# Patient Record
Sex: FEMALE | Race: ASIAN | NOT HISPANIC OR LATINO | Employment: FULL TIME | URBAN - METROPOLITAN AREA
[De-identification: names, ages, dates, MRNs, and addresses within clinical notes are randomized per-mention and may not be internally consistent; named-entity substitution may affect disease eponyms.]

---

## 2022-11-01 LAB
ALBUMIN SERPL BCG-MCNC: 4.3 G/DL (ref 3.5–5.2)
ALBUMIN UR-MCNC: 20 MG/DL
ALP SERPL-CCNC: 94 U/L (ref 35–104)
ALT SERPL W P-5'-P-CCNC: 202 U/L (ref 10–35)
ANION GAP SERPL CALCULATED.3IONS-SCNC: 10 MMOL/L (ref 7–15)
APPEARANCE UR: CLEAR
AST SERPL W P-5'-P-CCNC: 360 U/L (ref 10–35)
BASOPHILS # BLD AUTO: 0 10E3/UL (ref 0–0.2)
BASOPHILS NFR BLD AUTO: 0 %
BILIRUB DIRECT SERPL-MCNC: 0.65 MG/DL (ref 0–0.3)
BILIRUB SERPL-MCNC: 1.4 MG/DL
BILIRUB UR QL STRIP: NEGATIVE
BUN SERPL-MCNC: 9.2 MG/DL (ref 6–20)
CALCIUM SERPL-MCNC: 9.4 MG/DL (ref 8.6–10)
CHLORIDE SERPL-SCNC: 101 MMOL/L (ref 98–107)
COLOR UR AUTO: YELLOW
CREAT SERPL-MCNC: 0.93 MG/DL (ref 0.51–0.95)
DEPRECATED HCO3 PLAS-SCNC: 29 MMOL/L (ref 22–29)
EOSINOPHIL # BLD AUTO: 0 10E3/UL (ref 0–0.7)
EOSINOPHIL NFR BLD AUTO: 1 %
ERYTHROCYTE [DISTWIDTH] IN BLOOD BY AUTOMATED COUNT: 13.1 % (ref 10–15)
GFR SERPL CREATININE-BSD FRML MDRD: 87 ML/MIN/1.73M2
GLUCOSE SERPL-MCNC: 115 MG/DL (ref 70–99)
GLUCOSE UR STRIP-MCNC: NEGATIVE MG/DL
HCG UR QL: NEGATIVE
HCT VFR BLD AUTO: 41.3 % (ref 35–47)
HGB BLD-MCNC: 13.4 G/DL (ref 11.7–15.7)
HGB UR QL STRIP: ABNORMAL
HYALINE CASTS: 1 /LPF
IMM GRANULOCYTES # BLD: 0 10E3/UL
IMM GRANULOCYTES NFR BLD: 0 %
KETONES UR STRIP-MCNC: NEGATIVE MG/DL
LEUKOCYTE ESTERASE UR QL STRIP: NEGATIVE
LIPASE SERPL-CCNC: 33 U/L (ref 13–60)
LYMPHOCYTES # BLD AUTO: 1.6 10E3/UL (ref 0.8–5.3)
LYMPHOCYTES NFR BLD AUTO: 22 %
MCH RBC QN AUTO: 27.6 PG (ref 26.5–33)
MCHC RBC AUTO-ENTMCNC: 32.4 G/DL (ref 31.5–36.5)
MCV RBC AUTO: 85 FL (ref 78–100)
MONOCYTES # BLD AUTO: 0.6 10E3/UL (ref 0–1.3)
MONOCYTES NFR BLD AUTO: 8 %
MUCOUS THREADS #/AREA URNS LPF: PRESENT /LPF
NEUTROPHILS # BLD AUTO: 4.8 10E3/UL (ref 1.6–8.3)
NEUTROPHILS NFR BLD AUTO: 69 %
NITRATE UR QL: NEGATIVE
NRBC # BLD AUTO: 0 10E3/UL
NRBC BLD AUTO-RTO: 0 /100
PH UR STRIP: 6.5 [PH] (ref 5–7)
PLATELET # BLD AUTO: 327 10E3/UL (ref 150–450)
POTASSIUM SERPL-SCNC: 4 MMOL/L (ref 3.4–5.3)
PROT SERPL-MCNC: 7.4 G/DL (ref 6.4–8.3)
RBC # BLD AUTO: 4.86 10E6/UL (ref 3.8–5.2)
RBC URINE: 2 /HPF
SODIUM SERPL-SCNC: 140 MMOL/L (ref 136–145)
SP GR UR STRIP: 1.02 (ref 1–1.03)
SQUAMOUS EPITHELIAL: 4 /HPF
UROBILINOGEN UR STRIP-MCNC: 2 MG/DL
WBC # BLD AUTO: 7.1 10E3/UL (ref 4–11)
WBC URINE: 2 /HPF

## 2022-11-01 PROCEDURE — C9803 HOPD COVID-19 SPEC COLLECT: HCPCS

## 2022-11-01 PROCEDURE — 96375 TX/PRO/DX INJ NEW DRUG ADDON: CPT

## 2022-11-01 PROCEDURE — 81025 URINE PREGNANCY TEST: CPT | Performed by: STUDENT IN AN ORGANIZED HEALTH CARE EDUCATION/TRAINING PROGRAM

## 2022-11-01 PROCEDURE — 36415 COLL VENOUS BLD VENIPUNCTURE: CPT | Performed by: STUDENT IN AN ORGANIZED HEALTH CARE EDUCATION/TRAINING PROGRAM

## 2022-11-01 PROCEDURE — 82248 BILIRUBIN DIRECT: CPT | Performed by: STUDENT IN AN ORGANIZED HEALTH CARE EDUCATION/TRAINING PROGRAM

## 2022-11-01 PROCEDURE — 96361 HYDRATE IV INFUSION ADD-ON: CPT

## 2022-11-01 PROCEDURE — 96376 TX/PRO/DX INJ SAME DRUG ADON: CPT

## 2022-11-01 PROCEDURE — 81025 URINE PREGNANCY TEST: CPT | Performed by: EMERGENCY MEDICINE

## 2022-11-01 PROCEDURE — 85025 COMPLETE CBC W/AUTO DIFF WBC: CPT | Performed by: STUDENT IN AN ORGANIZED HEALTH CARE EDUCATION/TRAINING PROGRAM

## 2022-11-01 PROCEDURE — 93005 ELECTROCARDIOGRAM TRACING: CPT | Performed by: STUDENT IN AN ORGANIZED HEALTH CARE EDUCATION/TRAINING PROGRAM

## 2022-11-01 PROCEDURE — 81001 URINALYSIS AUTO W/SCOPE: CPT | Performed by: EMERGENCY MEDICINE

## 2022-11-01 PROCEDURE — 81001 URINALYSIS AUTO W/SCOPE: CPT | Performed by: STUDENT IN AN ORGANIZED HEALTH CARE EDUCATION/TRAINING PROGRAM

## 2022-11-01 PROCEDURE — 93005 ELECTROCARDIOGRAM TRACING: CPT | Performed by: EMERGENCY MEDICINE

## 2022-11-01 PROCEDURE — 99285 EMERGENCY DEPT VISIT HI MDM: CPT | Mod: 25

## 2022-11-01 PROCEDURE — 96366 THER/PROPH/DIAG IV INF ADDON: CPT

## 2022-11-01 PROCEDURE — 96365 THER/PROPH/DIAG IV INF INIT: CPT

## 2022-11-01 PROCEDURE — 85025 COMPLETE CBC W/AUTO DIFF WBC: CPT | Performed by: EMERGENCY MEDICINE

## 2022-11-01 PROCEDURE — 83690 ASSAY OF LIPASE: CPT | Performed by: STUDENT IN AN ORGANIZED HEALTH CARE EDUCATION/TRAINING PROGRAM

## 2022-11-01 PROCEDURE — 82248 BILIRUBIN DIRECT: CPT | Performed by: EMERGENCY MEDICINE

## 2022-11-01 PROCEDURE — 258N000003 HC RX IP 258 OP 636: Performed by: STUDENT IN AN ORGANIZED HEALTH CARE EDUCATION/TRAINING PROGRAM

## 2022-11-01 PROCEDURE — 83690 ASSAY OF LIPASE: CPT | Performed by: EMERGENCY MEDICINE

## 2022-11-01 RX ORDER — OXYCODONE AND ACETAMINOPHEN 5; 325 MG/1; MG/1
1 TABLET ORAL ONCE
Status: COMPLETED | OUTPATIENT
Start: 2022-11-02 | End: 2022-11-02

## 2022-11-01 RX ORDER — ONDANSETRON 2 MG/ML
4 INJECTION INTRAMUSCULAR; INTRAVENOUS
Status: COMPLETED | OUTPATIENT
Start: 2022-11-01 | End: 2022-11-02

## 2022-11-01 RX ADMIN — SODIUM CHLORIDE 500 ML: 9 INJECTION, SOLUTION INTRAVENOUS at 21:13

## 2022-11-02 ENCOUNTER — HOSPITAL ENCOUNTER (INPATIENT)
Facility: HOSPITAL | Age: 27
LOS: 1 days | Discharge: HOME | DRG: 445 | End: 2022-11-02
Attending: EMERGENCY MEDICINE | Admitting: INTERNAL MEDICINE

## 2022-11-02 ENCOUNTER — HOSPITAL ENCOUNTER (OUTPATIENT)
Facility: HOSPITAL | Age: 27
Setting detail: OBSERVATION
Discharge: LEFT BEFORE DISCHARGE | End: 2022-11-06
Attending: EMERGENCY MEDICINE | Admitting: INTERNAL MEDICINE

## 2022-11-02 ENCOUNTER — APPOINTMENT (OUTPATIENT)
Dept: MRI IMAGING | Facility: HOSPITAL | Age: 27
DRG: 445 | End: 2022-11-02
Attending: EMERGENCY MEDICINE

## 2022-11-02 ENCOUNTER — APPOINTMENT (OUTPATIENT)
Dept: ULTRASOUND IMAGING | Facility: HOSPITAL | Age: 27
End: 2022-11-02
Attending: EMERGENCY MEDICINE

## 2022-11-02 VITALS
DIASTOLIC BLOOD PRESSURE: 108 MMHG | RESPIRATION RATE: 16 BRPM | BODY MASS INDEX: 46.95 KG/M2 | OXYGEN SATURATION: 98 % | HEIGHT: 63 IN | TEMPERATURE: 98.8 F | SYSTOLIC BLOOD PRESSURE: 185 MMHG | WEIGHT: 265 LBS | HEART RATE: 68 BPM

## 2022-11-02 DIAGNOSIS — K80.50 CHOLEDOCHOLITHIASIS: ICD-10-CM

## 2022-11-02 DIAGNOSIS — I16.9 HYPERTENSIVE CRISIS: ICD-10-CM

## 2022-11-02 DIAGNOSIS — K80.50 CHOLEDOCHOLITHIASIS: Primary | ICD-10-CM

## 2022-11-02 PROBLEM — N17.9 ACUTE KIDNEY INJURY (H): Status: ACTIVE | Noted: 2022-11-02

## 2022-11-02 PROBLEM — E87.6 HYPOKALEMIA: Status: ACTIVE | Noted: 2022-11-02

## 2022-11-02 PROBLEM — D72.825 BANDEMIA: Status: ACTIVE | Noted: 2022-11-02

## 2022-11-02 LAB
ALBUMIN SERPL BCG-MCNC: 4.2 G/DL (ref 3.5–5.2)
ALBUMIN SERPL BCG-MCNC: 4.3 G/DL (ref 3.5–5.2)
ALP SERPL-CCNC: 128 U/L (ref 35–104)
ALP SERPL-CCNC: 129 U/L (ref 35–104)
ALT SERPL W P-5'-P-CCNC: 275 U/L (ref 10–35)
ALT SERPL W P-5'-P-CCNC: 316 U/L (ref 10–35)
ANION GAP SERPL CALCULATED.3IONS-SCNC: 9 MMOL/L (ref 7–15)
AST SERPL W P-5'-P-CCNC: 156 U/L (ref 10–35)
AST SERPL W P-5'-P-CCNC: 217 U/L (ref 10–35)
BASOPHILS # BLD AUTO: 0 10E3/UL (ref 0–0.2)
BASOPHILS NFR BLD AUTO: 0 %
BILIRUB DIRECT SERPL-MCNC: 0.65 MG/DL (ref 0–0.3)
BILIRUB SERPL-MCNC: 1.8 MG/DL
BILIRUB SERPL-MCNC: 2.4 MG/DL
BUN SERPL-MCNC: 9.8 MG/DL (ref 6–20)
CALCIUM SERPL-MCNC: 8.7 MG/DL (ref 8.6–10)
CHLORIDE SERPL-SCNC: 100 MMOL/L (ref 98–107)
CREAT SERPL-MCNC: 1.14 MG/DL (ref 0.51–0.95)
DEPRECATED HCO3 PLAS-SCNC: 28 MMOL/L (ref 22–29)
EOSINOPHIL # BLD AUTO: 0.1 10E3/UL (ref 0–0.7)
EOSINOPHIL NFR BLD AUTO: 1 %
ERYTHROCYTE [DISTWIDTH] IN BLOOD BY AUTOMATED COUNT: 13.4 % (ref 10–15)
GFR SERPL CREATININE-BSD FRML MDRD: 68 ML/MIN/1.73M2
GLUCOSE SERPL-MCNC: 87 MG/DL (ref 70–99)
HCT VFR BLD AUTO: 44.4 % (ref 35–47)
HGB BLD-MCNC: 14.3 G/DL (ref 11.7–15.7)
IMM GRANULOCYTES # BLD: 0.1 10E3/UL
IMM GRANULOCYTES NFR BLD: 0 %
LYMPHOCYTES # BLD AUTO: 1.5 10E3/UL (ref 0.8–5.3)
LYMPHOCYTES NFR BLD AUTO: 12 %
MAGNESIUM SERPL-MCNC: 2.1 MG/DL (ref 1.7–2.3)
MCH RBC QN AUTO: 27.4 PG (ref 26.5–33)
MCHC RBC AUTO-ENTMCNC: 32.2 G/DL (ref 31.5–36.5)
MCV RBC AUTO: 85 FL (ref 78–100)
MONOCYTES # BLD AUTO: 0.7 10E3/UL (ref 0–1.3)
MONOCYTES NFR BLD AUTO: 5 %
NEUTROPHILS # BLD AUTO: 10.5 10E3/UL (ref 1.6–8.3)
NEUTROPHILS NFR BLD AUTO: 82 %
NRBC # BLD AUTO: 0 10E3/UL
NRBC BLD AUTO-RTO: 0 /100
PLATELET # BLD AUTO: 376 10E3/UL (ref 150–450)
POTASSIUM SERPL-SCNC: 3.3 MMOL/L (ref 3.4–5.3)
PROT SERPL-MCNC: 7.2 G/DL (ref 6.4–8.3)
PROT SERPL-MCNC: 7.4 G/DL (ref 6.4–8.3)
RBC # BLD AUTO: 5.21 10E6/UL (ref 3.8–5.2)
SARS-COV-2 RNA RESP QL NAA+PROBE: NEGATIVE
SARS-COV-2 RNA RESP QL NAA+PROBE: NEGATIVE
SODIUM SERPL-SCNC: 137 MMOL/L (ref 136–145)
WBC # BLD AUTO: 12.8 10E3/UL (ref 4–11)

## 2022-11-02 PROCEDURE — 258N000003 HC RX IP 258 OP 636: Performed by: INTERNAL MEDICINE

## 2022-11-02 PROCEDURE — 74181 MRI ABDOMEN W/O CONTRAST: CPT

## 2022-11-02 PROCEDURE — 250N000011 HC RX IP 250 OP 636: Performed by: EMERGENCY MEDICINE

## 2022-11-02 PROCEDURE — 82248 BILIRUBIN DIRECT: CPT | Performed by: PHYSICIAN ASSISTANT

## 2022-11-02 PROCEDURE — 99254 IP/OBS CNSLTJ NEW/EST MOD 60: CPT | Performed by: PHYSICIAN ASSISTANT

## 2022-11-02 PROCEDURE — 83735 ASSAY OF MAGNESIUM: CPT | Performed by: EMERGENCY MEDICINE

## 2022-11-02 PROCEDURE — 99222 1ST HOSP IP/OBS MODERATE 55: CPT | Mod: AI | Performed by: INTERNAL MEDICINE

## 2022-11-02 PROCEDURE — 96374 THER/PROPH/DIAG INJ IV PUSH: CPT

## 2022-11-02 PROCEDURE — 250N000013 HC RX MED GY IP 250 OP 250 PS 637: Performed by: INTERNAL MEDICINE

## 2022-11-02 PROCEDURE — 120N000001 HC R&B MED SURG/OB

## 2022-11-02 PROCEDURE — 99223 1ST HOSP IP/OBS HIGH 75: CPT | Mod: AI | Performed by: INTERNAL MEDICINE

## 2022-11-02 PROCEDURE — 258N000003 HC RX IP 258 OP 636: Performed by: EMERGENCY MEDICINE

## 2022-11-02 PROCEDURE — 36415 COLL VENOUS BLD VENIPUNCTURE: CPT | Performed by: EMERGENCY MEDICINE

## 2022-11-02 PROCEDURE — 36415 COLL VENOUS BLD VENIPUNCTURE: CPT | Performed by: PHYSICIAN ASSISTANT

## 2022-11-02 PROCEDURE — 96366 THER/PROPH/DIAG IV INF ADDON: CPT

## 2022-11-02 PROCEDURE — 93005 ELECTROCARDIOGRAM TRACING: CPT | Performed by: STUDENT IN AN ORGANIZED HEALTH CARE EDUCATION/TRAINING PROGRAM

## 2022-11-02 PROCEDURE — 83690 ASSAY OF LIPASE: CPT | Performed by: EMERGENCY MEDICINE

## 2022-11-02 PROCEDURE — U0005 INFEC AGEN DETEC AMPLI PROBE: HCPCS | Performed by: EMERGENCY MEDICINE

## 2022-11-02 PROCEDURE — 99207 PR SC NO CHARGE VISIT: CPT | Performed by: SURGERY

## 2022-11-02 PROCEDURE — 76705 ECHO EXAM OF ABDOMEN: CPT

## 2022-11-02 PROCEDURE — 93005 ELECTROCARDIOGRAM TRACING: CPT | Performed by: EMERGENCY MEDICINE

## 2022-11-02 PROCEDURE — 85025 COMPLETE CBC W/AUTO DIFF WBC: CPT | Performed by: EMERGENCY MEDICINE

## 2022-11-02 PROCEDURE — 99285 EMERGENCY DEPT VISIT HI MDM: CPT | Mod: 25

## 2022-11-02 PROCEDURE — 250N000011 HC RX IP 250 OP 636: Performed by: INTERNAL MEDICINE

## 2022-11-02 PROCEDURE — 96365 THER/PROPH/DIAG IV INF INIT: CPT

## 2022-11-02 PROCEDURE — 250N000013 HC RX MED GY IP 250 OP 250 PS 637: Performed by: EMERGENCY MEDICINE

## 2022-11-02 PROCEDURE — 96375 TX/PRO/DX INJ NEW DRUG ADDON: CPT

## 2022-11-02 PROCEDURE — 82040 ASSAY OF SERUM ALBUMIN: CPT | Performed by: EMERGENCY MEDICINE

## 2022-11-02 PROCEDURE — 99207 PR APP CREDIT; MD BILLING SHARED VISIT: CPT | Performed by: INTERNAL MEDICINE

## 2022-11-02 PROCEDURE — C9803 HOPD COVID-19 SPEC COLLECT: HCPCS

## 2022-11-02 RX ORDER — PROCHLORPERAZINE 25 MG
25 SUPPOSITORY, RECTAL RECTAL EVERY 12 HOURS PRN
Status: DISCONTINUED | OUTPATIENT
Start: 2022-11-02 | End: 2022-11-02 | Stop reason: HOSPADM

## 2022-11-02 RX ORDER — PIPERACILLIN SODIUM, TAZOBACTAM SODIUM 3; .375 G/15ML; G/15ML
3.38 INJECTION, POWDER, LYOPHILIZED, FOR SOLUTION INTRAVENOUS EVERY 8 HOURS
Status: DISCONTINUED | OUTPATIENT
Start: 2022-11-02 | End: 2022-11-02 | Stop reason: HOSPADM

## 2022-11-02 RX ORDER — PROCHLORPERAZINE MALEATE 10 MG
10 TABLET ORAL EVERY 6 HOURS PRN
Status: DISCONTINUED | OUTPATIENT
Start: 2022-11-02 | End: 2022-11-02 | Stop reason: HOSPADM

## 2022-11-02 RX ORDER — ACETAMINOPHEN 325 MG/1
650 TABLET ORAL ONCE
Status: DISCONTINUED | OUTPATIENT
Start: 2022-11-02 | End: 2022-11-02 | Stop reason: HOSPADM

## 2022-11-02 RX ORDER — HYDROMORPHONE HYDROCHLORIDE 1 MG/ML
0.2 INJECTION, SOLUTION INTRAMUSCULAR; INTRAVENOUS; SUBCUTANEOUS
Status: DISCONTINUED | OUTPATIENT
Start: 2022-11-02 | End: 2022-11-02 | Stop reason: HOSPADM

## 2022-11-02 RX ORDER — LIDOCAINE 40 MG/G
CREAM TOPICAL
Status: DISCONTINUED | OUTPATIENT
Start: 2022-11-02 | End: 2022-11-06 | Stop reason: HOSPADM

## 2022-11-02 RX ORDER — PIPERACILLIN SODIUM, TAZOBACTAM SODIUM 3; .375 G/15ML; G/15ML
3.38 INJECTION, POWDER, LYOPHILIZED, FOR SOLUTION INTRAVENOUS ONCE
Status: DISCONTINUED | OUTPATIENT
Start: 2022-11-02 | End: 2022-11-02

## 2022-11-02 RX ORDER — HYDROMORPHONE HYDROCHLORIDE 1 MG/ML
0.5 INJECTION, SOLUTION INTRAMUSCULAR; INTRAVENOUS; SUBCUTANEOUS
Status: DISCONTINUED | OUTPATIENT
Start: 2022-11-02 | End: 2022-11-05

## 2022-11-02 RX ORDER — ONDANSETRON 2 MG/ML
4 INJECTION INTRAMUSCULAR; INTRAVENOUS ONCE
Status: COMPLETED | OUTPATIENT
Start: 2022-11-02 | End: 2022-11-02

## 2022-11-02 RX ORDER — OXYCODONE HYDROCHLORIDE 5 MG/1
5 TABLET ORAL EVERY 4 HOURS PRN
Status: DISCONTINUED | OUTPATIENT
Start: 2022-11-02 | End: 2022-11-02 | Stop reason: HOSPADM

## 2022-11-02 RX ORDER — AMOXICILLIN 250 MG
2 CAPSULE ORAL 2 TIMES DAILY PRN
Status: DISCONTINUED | OUTPATIENT
Start: 2022-11-02 | End: 2022-11-06 | Stop reason: HOSPADM

## 2022-11-02 RX ORDER — HYDRALAZINE HYDROCHLORIDE 20 MG/ML
10 INJECTION INTRAMUSCULAR; INTRAVENOUS ONCE
Status: COMPLETED | OUTPATIENT
Start: 2022-11-02 | End: 2022-11-03

## 2022-11-02 RX ORDER — HYDRALAZINE HYDROCHLORIDE 20 MG/ML
20 INJECTION INTRAMUSCULAR; INTRAVENOUS ONCE
Status: COMPLETED | OUTPATIENT
Start: 2022-11-02 | End: 2022-11-02

## 2022-11-02 RX ORDER — SODIUM CHLORIDE AND POTASSIUM CHLORIDE 150; 900 MG/100ML; MG/100ML
INJECTION, SOLUTION INTRAVENOUS CONTINUOUS
Status: DISCONTINUED | OUTPATIENT
Start: 2022-11-03 | End: 2022-11-05

## 2022-11-02 RX ORDER — ONDANSETRON 2 MG/ML
4 INJECTION INTRAMUSCULAR; INTRAVENOUS EVERY 6 HOURS PRN
Status: DISCONTINUED | OUTPATIENT
Start: 2022-11-02 | End: 2022-11-06 | Stop reason: HOSPADM

## 2022-11-02 RX ORDER — ACETAMINOPHEN 650 MG/1
650 SUPPOSITORY RECTAL EVERY 6 HOURS PRN
Status: DISCONTINUED | OUTPATIENT
Start: 2022-11-02 | End: 2022-11-06 | Stop reason: HOSPADM

## 2022-11-02 RX ORDER — ACETAMINOPHEN 325 MG/1
650 TABLET ORAL EVERY 6 HOURS PRN
Status: DISCONTINUED | OUTPATIENT
Start: 2022-11-02 | End: 2022-11-02 | Stop reason: HOSPADM

## 2022-11-02 RX ORDER — PIPERACILLIN SODIUM, TAZOBACTAM SODIUM 3; .375 G/15ML; G/15ML
3.38 INJECTION, POWDER, LYOPHILIZED, FOR SOLUTION INTRAVENOUS EVERY 8 HOURS
Status: DISCONTINUED | OUTPATIENT
Start: 2022-11-03 | End: 2022-11-05

## 2022-11-02 RX ORDER — HYDRALAZINE HYDROCHLORIDE 20 MG/ML
10 INJECTION INTRAMUSCULAR; INTRAVENOUS EVERY 6 HOURS PRN
Status: DISCONTINUED | OUTPATIENT
Start: 2022-11-02 | End: 2022-11-02 | Stop reason: HOSPADM

## 2022-11-02 RX ORDER — KETOROLAC TROMETHAMINE 15 MG/ML
15 INJECTION, SOLUTION INTRAMUSCULAR; INTRAVENOUS ONCE
Status: COMPLETED | OUTPATIENT
Start: 2022-11-02 | End: 2022-11-02

## 2022-11-02 RX ORDER — ONDANSETRON 4 MG/1
4 TABLET, ORALLY DISINTEGRATING ORAL EVERY 6 HOURS PRN
Status: DISCONTINUED | OUTPATIENT
Start: 2022-11-02 | End: 2022-11-06 | Stop reason: HOSPADM

## 2022-11-02 RX ORDER — ACETAMINOPHEN 325 MG/1
650 TABLET ORAL EVERY 6 HOURS PRN
Status: DISCONTINUED | OUTPATIENT
Start: 2022-11-02 | End: 2022-11-06 | Stop reason: HOSPADM

## 2022-11-02 RX ORDER — LIDOCAINE 40 MG/G
CREAM TOPICAL
Status: DISCONTINUED | OUTPATIENT
Start: 2022-11-02 | End: 2022-11-02 | Stop reason: HOSPADM

## 2022-11-02 RX ORDER — AMOXICILLIN 250 MG
1 CAPSULE ORAL 2 TIMES DAILY PRN
Status: DISCONTINUED | OUTPATIENT
Start: 2022-11-02 | End: 2022-11-06 | Stop reason: HOSPADM

## 2022-11-02 RX ORDER — PIPERACILLIN SODIUM, TAZOBACTAM SODIUM 3; .375 G/15ML; G/15ML
3.38 INJECTION, POWDER, LYOPHILIZED, FOR SOLUTION INTRAVENOUS ONCE
Status: COMPLETED | OUTPATIENT
Start: 2022-11-02 | End: 2022-11-02

## 2022-11-02 RX ORDER — ACETAMINOPHEN 650 MG/1
650 SUPPOSITORY RECTAL EVERY 6 HOURS PRN
Status: DISCONTINUED | OUTPATIENT
Start: 2022-11-02 | End: 2022-11-02 | Stop reason: HOSPADM

## 2022-11-02 RX ADMIN — SODIUM CHLORIDE 1000 ML: 9 INJECTION, SOLUTION INTRAVENOUS at 22:43

## 2022-11-02 RX ADMIN — KETOROLAC TROMETHAMINE 15 MG: 15 INJECTION, SOLUTION INTRAMUSCULAR; INTRAVENOUS at 22:36

## 2022-11-02 RX ADMIN — ACETAMINOPHEN 650 MG: 325 TABLET, FILM COATED ORAL at 10:40

## 2022-11-02 RX ADMIN — FAMOTIDINE 20 MG: 10 INJECTION, SOLUTION INTRAVENOUS at 04:21

## 2022-11-02 RX ADMIN — OXYCODONE HYDROCHLORIDE AND ACETAMINOPHEN 1 TABLET: 5; 325 TABLET ORAL at 00:19

## 2022-11-02 RX ADMIN — ONDANSETRON 4 MG: 2 INJECTION INTRAMUSCULAR; INTRAVENOUS at 22:32

## 2022-11-02 RX ADMIN — ONDANSETRON 4 MG: 2 INJECTION INTRAMUSCULAR; INTRAVENOUS at 00:17

## 2022-11-02 RX ADMIN — HYDROMORPHONE HYDROCHLORIDE 1 MG: 1 INJECTION, SOLUTION INTRAMUSCULAR; INTRAVENOUS; SUBCUTANEOUS at 22:34

## 2022-11-02 RX ADMIN — HYDRALAZINE HYDROCHLORIDE 20 MG: 20 INJECTION, SOLUTION INTRAMUSCULAR; INTRAVENOUS at 01:41

## 2022-11-02 RX ADMIN — DEXTROSE AND SODIUM CHLORIDE: 5; 900 INJECTION, SOLUTION INTRAVENOUS at 04:21

## 2022-11-02 RX ADMIN — PIPERACILLIN AND TAZOBACTAM 3.38 G: 3; .375 INJECTION, POWDER, LYOPHILIZED, FOR SOLUTION INTRAVENOUS at 01:41

## 2022-11-02 RX ADMIN — FAMOTIDINE 20 MG: 10 INJECTION, SOLUTION INTRAVENOUS at 08:17

## 2022-11-02 RX ADMIN — PIPERACILLIN AND TAZOBACTAM 3.38 G: 3; .375 INJECTION, POWDER, LYOPHILIZED, FOR SOLUTION INTRAVENOUS at 08:17

## 2022-11-02 ASSESSMENT — ACTIVITIES OF DAILY LIVING (ADL)
ADLS_ACUITY_SCORE: 35
DEPENDENT_IADLS:: INDEPENDENT
ADLS_ACUITY_SCORE: 35
ADLS_ACUITY_SCORE: 35

## 2022-11-02 ASSESSMENT — ENCOUNTER SYMPTOMS
VOMITING: 1
ABDOMINAL PAIN: 1
FEVER: 0
NAUSEA: 1

## 2022-11-02 NOTE — CONSULTS
General Surgery Consultation  Lian Carney MRN# [id number]   Age/Sex: 27 year old female YOB: 1995     Reason for consult: 1. Choledocholithiasis            Requesting physician: Dr Cornell                   Assessment and Plan:   Assessment:  Cholelithiasis with acute on chronic cholecystitis with possible choledocholithiasis and passing stone    Plan:  -Continue Zosyn  -Reviewed pathophysiology and surgery with patient.  At this time she wants to discuss this with her parents before making a decision to move forward with surgery.  -LFTs repeated and still pending-if the LFTs are elevating and she may also need an ERCP  -Thank you for consulting us following us in her care.          Chief Complaint:     Chief Complaint   Patient presents with     Abdominal Pain        History is obtained from the patient    HPI:   Lian Carney is a 27 year old female with history of appendectomy, obesity who presents to the emergency room with worsening abdominal pain.  Has had on and off pain for 3 months.  No association with food or not eating.  She will get associated nausea with it as well.  Pain is usually epigastric and sometimes right upper quadrant.  It is usually sharp in nature.  She usually takes Tums which does normally relieve her pain however this time it did not.  She had associated vomiting with this episode abdominal pain that started yesterday evening.  Her pain has improved since then.  Currently denies any pain, nausea or vomiting.  Denies any change in stools or acholic stools.  Denies fever, chills, chest pain, shortness of breath, coughing, headaches, back pain or dysuria.  Upon evaluation she had cholelithiasis on right upper quadrant ultrasound as well as a dilated CBD at 9 mm with diffuse fatty liver.  She underwent an MRCP this morning which shows cholelithiasis and edema.  Cholecystic around the right upper quadrant and into the second duodenum without biliary dilation or stones.  No  "leukocytosis present.  She has a total bili of 1.4 with direct bili of 0.65, ,  and a normal alk phos at 94.  Again LFTs have not been drawn since order this morning and when I ordered an hour and a half ago stat.          Past Medical History:   History reviewed. No pertinent past medical history.           Past Surgical History:   History reviewed. No pertinent surgical history.          Social History:    reports that she has never smoked. She does not have any smokeless tobacco history on file.           Family History:   History reviewed. No pertinent family history.           Allergies:   No Known Allergies           Medications:     Prior to Admission medications    Not on File              Review of Systems:   The Review of Systems is negative other than noted in the HPI            Physical Exam:     Patient Vitals for the past 24 hrs:   BP Temp Temp src Pulse Resp SpO2 Height Weight   11/02/22 1046 (!) 185/108 -- -- 68 16 98 % -- --   11/02/22 0815 (!) 173/107 -- -- 67 16 97 % -- --   11/02/22 0423 (!) 189/109 -- -- 79 18 -- -- --   11/02/22 0255 (!) 182/112 -- -- -- -- -- -- --   11/02/22 0229 (!) 200/113 -- -- 79 -- -- -- --   11/02/22 0150 (!) 178/112 -- -- 68 -- -- -- --   11/02/22 0121 (!) 226/142 -- -- 80 -- -- 1.6 m (5' 3\") 120.2 kg (265 lb)   11/02/22 0004 (!) 247/142 -- -- 71 -- 98 % -- --   11/01/22 2023 (!) 234/156 98.8  F (37.1  C) Oral 75 18 99 % -- 120.2 kg (265 lb)        No intake or output data in the 24 hours ending 11/02/22 1302   Constitutional:   awake, alert, cooperative, no apparent distress, and appears stated age       Eyes:   PERRL, conjunctiva/corneas clear, EOM's intact; no scleral edema or icterus noted        ENT:   Normocephalic, without obvious abnormality, atraumatic, Lips, mucosa, and tongue normal        Hematologic / Lymphatic:   No lymphadenopathy       Lungs:   Normal respiratory effort, no accessory muscle use       Cardiovascular:   Regular rate and " rhythm       Abdomen:   Soft tender right upper quadrant and epigastric.  Some guarding without peritoneal signs present or rebound.       Musculoskeletal:   No obvious swelling, bruising or deformity       Skin:   Skin color and texture normal for patient, no rashes or lesions              Data:         All imaging studies reviewed by me.    Results for orders placed or performed during the hospital encounter of 11/02/22 (from the past 24 hour(s))   CBC with Platelets & Differential    Narrative    The following orders were created for panel order CBC with Platelets & Differential.  Procedure                               Abnormality         Status                     ---------                               -----------         ------                     CBC with platelets and d...[[id number]]                      Final result                 Please view results for these tests on the individual orders.   Basic metabolic panel   Result Value Ref Range    Sodium 140 136 - 145 mmol/L    Potassium 4.0 3.4 - 5.3 mmol/L    Chloride 101 98 - 107 mmol/L    Carbon Dioxide (CO2) 29 22 - 29 mmol/L    Anion Gap 10 7 - 15 mmol/L    Urea Nitrogen 9.2 6.0 - 20.0 mg/dL    Creatinine 0.93 0.51 - 0.95 mg/dL    Calcium 9.4 8.6 - 10.0 mg/dL    Glucose 115 (H) 70 - 99 mg/dL    GFR Estimate 87 >60 mL/min/1.73m2   Hepatic function panel   Result Value Ref Range    Protein Total 7.4 6.4 - 8.3 g/dL    Albumin 4.3 3.5 - 5.2 g/dL    Bilirubin Total 1.4 (H) <=1.2 mg/dL    Alkaline Phosphatase 94 35 - 104 U/L     (H) 10 - 35 U/L     (H) 10 - 35 U/L    Bilirubin Direct 0.65 (H) 0.00 - 0.30 mg/dL   Lipase   Result Value Ref Range    Lipase 33 13 - 60 U/L   CBC with platelets and differential   Result Value Ref Range    WBC Count 7.1 4.0 - 11.0 10e3/uL    RBC Count 4.86 3.80 - 5.20 10e6/uL    Hemoglobin 13.4 11.7 - 15.7 g/dL    Hematocrit 41.3 35.0 - 47.0 %    MCV 85 78 - 100 fL    MCH 27.6 26.5 - 33.0 pg    MCHC 32.4 31.5 - 36.5  g/dL    RDW 13.1 10.0 - 15.0 %    Platelet Count 327 150 - 450 10e3/uL    % Neutrophils 69 %    % Lymphocytes 22 %    % Monocytes 8 %    % Eosinophils 1 %    % Basophils 0 %    % Immature Granulocytes 0 %    NRBCs per 100 WBC 0 <1 /100    Absolute Neutrophils 4.8 1.6 - 8.3 10e3/uL    Absolute Lymphocytes 1.6 0.8 - 5.3 10e3/uL    Absolute Monocytes 0.6 0.0 - 1.3 10e3/uL    Absolute Eosinophils 0.0 0.0 - 0.7 10e3/uL    Absolute Basophils 0.0 0.0 - 0.2 10e3/uL    Absolute Immature Granulocytes 0.0 <=0.4 10e3/uL    Absolute NRBCs 0.0 10e3/uL   HCG qualitative urine   Result Value Ref Range    hCG Urine Qualitative Negative Negative   UA with Microscopic reflex to Culture    Specimen: Urine, Midstream   Result Value Ref Range    Color Urine Yellow Colorless, Straw, Light Yellow, Yellow    Appearance Urine Clear Clear    Glucose Urine Negative Negative mg/dL    Bilirubin Urine Negative Negative    Ketones Urine Negative Negative mg/dL    Specific Gravity Urine 1.021 1.001 - 1.030    Blood Urine 0.03 mg/dL (A) Negative    pH Urine 6.5 5.0 - 7.0    Protein Albumin Urine 20 (A) Negative mg/dL    Urobilinogen Urine 2.0 (A) <2.0 mg/dL    Nitrite Urine Negative Negative    Leukocyte Esterase Urine Negative Negative    Mucus Urine Present (A) None Seen /LPF    RBC Urine 2 <=2 /HPF    WBC Urine 2 <=5 /HPF    Squamous Epithelials Urine 4 (H) <=1 /HPF    Hyaline Casts Urine 1 <=2 /LPF    Narrative    Urine Culture not indicated   Abdomen US, limited (RUQ only)    Narrative    EXAM: US ABDOMEN LIMITED  LOCATION: [facility]  DATE/TIME: 11/2/2022 12:51 AM    INDICATION: Epigastric and right upper quadrant tenderness and pain, elevated bilirubin and LFTs.  COMPARISON: None.  TECHNIQUE: Limited abdominal ultrasound.    FINDINGS:    GALLBLADDER: There are several small gallstones in the dependent portion of the gallbladder. No gallbladder wall thickening or pericholecystic fluid.    BILE DUCTS: No intrahepatic  biliary dilatation. Common bile duct however is dilated up to 9 mm.    LIVER: Increased echogenicity from diffuse fatty infiltration. No focal mass.    RIGHT KIDNEY: No hydronephrosis.    PANCREAS: Obscured by overlying bowel gas.    No ascites.      Impression    IMPRESSION:  1.  Cholelithiasis without definite findings for cholecystitis.    2.  However, the common bile duct is dilated up to 9 mm. Cannot exclude a distal common bile duct stone or obstructing process. Clinical correlation. MRCP would be helpful in further evaluation if indicated.    3.  Diffuse fatty infiltration of the liver.     4.  Pancreas obscured by overlying bowel gas.       MR Abdomen MRCP without Contrast    Narrative    EXAM: MR ABDOMEN MRCP W/O CONTRAST  LOCATION: [facility]  DATE/TIME: 11/2/2022 2:19 AM    INDICATION: RUQ pain, tenderness, abnormal LFTs, dilated CBD, eval choledocholithiasis  COMPARISON: Ultrasound 11/2/2022.  TECHNIQUE: Routine MR liver/pancreas protocol including axial and coronal MRCP sequences. 2D and 3D reconstruction performed by MR technologist including MIP reconstruction and slab cholangiograms. If performed with contrast, additional dynamic T1 post   IV contrast images.  CONTRAST: None.     FINDINGS:     MRCP: There are several small stones in the gallbladder. There is edema in the sanjay hepatis and surrounding the gallbladder and extending to the second duodenum. The common bile duct is at the upper limits of normal at 6 mm diameter. No biliary ductal   stones. The pancreatic duct is normal in caliber throughout.    LIVER: Unremarkable.    PANCREAS: Unremarkable.    ADDITIONAL FINDINGS: The spleen, adrenal glands and kidneys are normal.. No abdominal lymph node enlargement. Trace amount of ascites at the inferior liver margin.      Impression    IMPRESSION:  1.  Cholelithiasis. There is edema in the right upper quadrant about the gallbladder and extending to the second duodenum. These  findings suggest acute cholecystitis.  2.  There is no biliary dilatation or biliary ductal stone.  3.  Trace ascites.   Asymptomatic COVID-19 Virus (Coronavirus) by PCR Nasopharyngeal    Specimen: Nasopharyngeal; Swab   Result Value Ref Range    SARS CoV2 PCR Negative Negative    Narrative    Testing was performed using the Xpert Xpress SARS-CoV-2 Assay on the   Cepheid Gene-Xpert Instrument Systems. Additional information about   this Emergency Use Authorization (EUA) assay can be found via the Lab   Guide. This test should be ordered for the detection of SARS-CoV-2 in   individuals who meet SARS-CoV-2 clinical and/or epidemiological   criteria. Test performance is unknown in asymptomatic patients. This   test is for in vitro diagnostic use under the FDA EUA for   laboratories certified under CLIA to perform high complexity testing.   This test has not been FDA cleared or approved. A negative result   does not rule out the presence of PCR inhibitors in the specimen or   target RNA in concentration below the limit of detection for the   assay. The possibility of a false negative should be considered if   the patient's recent exposure or clinical presentation suggests   COVID-19. This test was validated by the [facility]. This laboratory is certified under the Clinical Laboratory Improvement Amendments of 1988 (CLIA-88) as qualified to perform high complexity laboratory testing.          CASEY Mcneil  [facility]  [address] [place] [address]  Phone- [phone]  Fax- [phone]

## 2022-11-02 NOTE — ED NOTES
ER DISCHARGE NOTE:   Lian Carney  MRN: [id number]    Lian Carney is ok to discharge from the emergency room.    (K80.50) Choledocholithiasis  Comment: pain free  Plan: follow  Up with surgery        Lian Carney discharged via self   Verbalized understanding of discharge instructions.   Prescriptions: none  AVS in hand.

## 2022-11-02 NOTE — DISCHARGE INSTRUCTIONS
She is opted for outpatient treatment and will follow up with surgery for future cholecystectomy.  Patient advised to return if fevers, increasing or intractable pain, nausea or vomiting.

## 2022-11-02 NOTE — CONSULTS
"Care Management Initial Consult    General Information  Assessment completed with: Patient, Lian  Type of CM/SW Visit: Initial Assessment    Primary Care Provider verified and updated as needed: Yes   Readmission within the last 30 days: no previous admission in last 30 days      Reason for Consult: discharge planning  Advance Care Planning: Advance Care Planning Reviewed: no concerns identified          Communication Assessment  Patient's communication style: spoken language (English or Bilingual)             Cognitive  Cognitive/Neuro/Behavioral:                        Living Environment:   People in home: alone     Current living Arrangements: apartment (\"apartment in a house\")      Able to return to prior arrangements: yes       Family/Social Support:  Care provided by: self  Provides care for: no one     Sibling(s)          Description of Support System: Supportive, Involved    Support Assessment: Adequate family and caregiver support, Adequate social supports, Patient communicates needs well met    Current Resources:   Patient receiving home care services: No     Community Resources: None  Equipment currently used at home: none  Supplies currently used at home: None    Employment/Financial:  Employment Status: employed full-time        Financial Concerns:     Referral to Financial Worker: No       Lifestyle & Psychosocial Needs:  Social Determinants of Health     Tobacco Use: Unknown     Smoking Tobacco Use: Never     Smokeless Tobacco Use: Unknown     Passive Exposure: Not on file   Alcohol Use: Not on file   Financial Resource Strain: Not on file   Food Insecurity: Not on file   Transportation Needs: Not on file   Physical Activity: Not on file   Stress: Not on file   Social Connections: Not on file   Intimate Partner Violence: Not on file   Depression: Not on file   Housing Stability: Not on file       Functional Status:  Prior to admission patient needed assistance:   Dependent ADLs:: Independent, " Ambulation-no assistive device  Dependent IADLs:: Independent  Assesssment of Functional Status: At functional baseline    Mental Health Status:          Chemical Dependency Status:                Values/Beliefs:  Spiritual, Cultural Beliefs, Caodaism Practices, Values that affect care:                 Additional Information:  See note below    Care Management Discharge Note    Discharge Date: 11/02/2022       Discharge Disposition:  To home, no new needs    Discharge Services:  No new services    Discharge DME:  Nothing new    Discharge Transportation:  family    Private pay costs discussed: Not applicable    PAS Confirmation Code:    Patient/family educated on Medicare website which has current facility and service quality ratings:      Education Provided on the Discharge Plan:    Persons Notified of Discharge Plans: Lian   Patient/Family in Agreement with the Plan:      Handoff Referral Completed: No    Additional Information:  Lian lives in an apartment alone. She is independent with ADLs at baseline.    Family to transport at discharge.      Candace Elizalde RN

## 2022-11-02 NOTE — H&P
Admission History and Physical   Lian Carney,  1995, MRN [id number]      Choledocholithiasis [K80.50]    PCP: No Ref-Primary, Physician, [unfilled], None   Code status:  No Order       Extended Emergency Contact Information  Primary Emergency Contact: MARY MCKEON  Mobile Phone: [phone]  Relation: Sister-in-Law  Secondary Emergency Contact: Spencer Carney   [place]  Mobile Phone: [phone]  Relation: Sister       Assessment and Plan     Assessment:  27-year-old morbidly obese female who presented with epigastric pain for the past 3 to 4 months which is worse today and not relieved with Tums.  LFTs are elevated.  Ultrasound shows several gallstones and fatty liver.  Choledocholithiasis suspected.  GI consulted and requested MRCP.  Patient will be kept n.p.o.  Blood pressure is quite elevated likely due to pain and underlying undiagnosed hypertension    Acute choledocholithiasis  --Awaiting MRCP  -- Order n.p.o. IV fluids and IV Dilaudid  -- GI consulted by ER physician  -- Continue empirical Zosyn    Nonalcoholic fatty liver likely due to obesity  -- Patient counseled to lose weight    Probable KODY  -- May need sleep study as outpatient    Morbid obesity with BMI greater than 45  -- Patient counseled to lose weight    Ongoing vaping  -- Patient counseled to quit vaping    UA positive proteinuria  -- Also positive for squamous cells indicating contamination with vaginal cells    Hypertensive urgency SBP greater than 200 on presentation  -- Likely precipitated by pain  -- Order IV Dilaudid as needed.  Anticipate improvement with pain control  -- Order IV hydralazine 10 mg IV for SBP greater than 200      Probable GERD  -- Order IV Pepcid twice daily    Plan:    Pt would be admitted as inpatient and will likely stay for greaterthan 2 midnights.    Precautions: None    Nutrition: N.p.o.    Activity: As tolerated    IV fluids: D5 normal saline at 50    Antibiotics: Zosyn    DVT prophylaxis: None due to  procedure    GI prophylaxis: Pepcid    Consult: GI    Monitor: LFT    Labs: LFT    Imaging: MRCP      Total unit/floor time 70 minutes. Greater than 50% was spent in counseling with the patient on discussions regarding vaping.      Chief Complaint:  Epigastric pain worse in the last 1day     HPI:    Informant is patient reliable  Lian Carney is a 27 year old old female who is morbidly obese and does not see a primary care doctor on a regular basis presented with worsening of epigastric pain not relieved by Tums.  Patient has had epigastric pain for the past 3 to 4 months which gets better with Tums and worse eating tomatoes.  However this morning her pain did not get better and rated as 8 out of 10 in severity.  She did vomit 4-5 times before coming to the ER.  She denies any scleral icterus.  Patient denies any prior history of hypertension.  Her blood pressure was greater than 200 on presentation.  She denies any headache double vision chest pain dizziness.                     Medical History      History reviewed. No pertinent past medical history.   Family History  Reviewed, and family history is not on file.          Allergies  No Known Allergies Social History  Reviewed, and she patient does vape  Denies alcohol    Review of Systems:  10-point ROS negative, except as noted in HPI            Prior to Admission Medications   (Not in a hospital admission)         Physical Exam:  Temp:  [98.8  F (37.1  C)] 98.8  F (37.1  C)  Pulse:  [71-80] 80  Resp:  [18] 18  BP: (226-247)/(142-156) 226/142  SpO2:  [98 %-99 %] 98 %  Wt Readings from Last 5 Encounters:   11/02/22 120.2 kg (265 lb)     Body mass index is 46.94 kg/m .  Alert, oriented*3  No pallor, icterus, clubbing, cyanosis  Well-nourished  No sinus tenderness  Dry mucus membranes  Neck supple, but thick  CVS: S1 S2-N, no murmurs, gallops, rubs  Resp: B/L vesicular breath sounds, no wheezing, crackles   Abd: Did not elicit tenderness positive bowel sounds  Neuro:  no involuntary movements such as tremors  Vasc: no leg edema  No clubbing  Skin--no generalized skin rash     Pertinent Labs  Lab Results:  Recent Results (from the past 24 hour(s))   Basic metabolic panel    Collection Time: 11/01/22  9:05 PM   Result Value Ref Range    Sodium 140 136 - 145 mmol/L    Potassium 4.0 3.4 - 5.3 mmol/L    Chloride 101 98 - 107 mmol/L    Carbon Dioxide (CO2) 29 22 - 29 mmol/L    Anion Gap 10 7 - 15 mmol/L    Urea Nitrogen 9.2 6.0 - 20.0 mg/dL    Creatinine 0.93 0.51 - 0.95 mg/dL    Calcium 9.4 8.6 - 10.0 mg/dL    Glucose 115 (H) 70 - 99 mg/dL    GFR Estimate 87 >60 mL/min/1.73m2   Hepatic function panel    Collection Time: 11/01/22  9:05 PM   Result Value Ref Range    Protein Total 7.4 6.4 - 8.3 g/dL    Albumin 4.3 3.5 - 5.2 g/dL    Bilirubin Total 1.4 (H) <=1.2 mg/dL    Alkaline Phosphatase 94 35 - 104 U/L     (H) 10 - 35 U/L     (H) 10 - 35 U/L    Bilirubin Direct 0.65 (H) 0.00 - 0.30 mg/dL   Lipase    Collection Time: 11/01/22  9:05 PM   Result Value Ref Range    Lipase 33 13 - 60 U/L   CBC with platelets and differential    Collection Time: 11/01/22  9:05 PM   Result Value Ref Range    WBC Count 7.1 4.0 - 11.0 10e3/uL    RBC Count 4.86 3.80 - 5.20 10e6/uL    Hemoglobin 13.4 11.7 - 15.7 g/dL    Hematocrit 41.3 35.0 - 47.0 %    MCV 85 78 - 100 fL    MCH 27.6 26.5 - 33.0 pg    MCHC 32.4 31.5 - 36.5 g/dL    RDW 13.1 10.0 - 15.0 %    Platelet Count 327 150 - 450 10e3/uL    % Neutrophils 69 %    % Lymphocytes 22 %    % Monocytes 8 %    % Eosinophils 1 %    % Basophils 0 %    % Immature Granulocytes 0 %    NRBCs per 100 WBC 0 <1 /100    Absolute Neutrophils 4.8 1.6 - 8.3 10e3/uL    Absolute Lymphocytes 1.6 0.8 - 5.3 10e3/uL    Absolute Monocytes 0.6 0.0 - 1.3 10e3/uL    Absolute Eosinophils 0.0 0.0 - 0.7 10e3/uL    Absolute Basophils 0.0 0.0 - 0.2 10e3/uL    Absolute Immature Granulocytes 0.0 <=0.4 10e3/uL    Absolute NRBCs 0.0 10e3/uL   HCG qualitative urine    Collection  Time: 11/01/22  9:12 PM   Result Value Ref Range    hCG Urine Qualitative Negative Negative   UA with Microscopic reflex to Culture    Collection Time: 11/01/22  9:12 PM    Specimen: Urine, Midstream   Result Value Ref Range    Color Urine Yellow Colorless, Straw, Light Yellow, Yellow    Appearance Urine Clear Clear    Glucose Urine Negative Negative mg/dL    Bilirubin Urine Negative Negative    Ketones Urine Negative Negative mg/dL    Specific Gravity Urine 1.021 1.001 - 1.030    Blood Urine 0.03 mg/dL (A) Negative    pH Urine 6.5 5.0 - 7.0    Protein Albumin Urine 20 (A) Negative mg/dL    Urobilinogen Urine 2.0 (A) <2.0 mg/dL    Nitrite Urine Negative Negative    Leukocyte Esterase Urine Negative Negative    Mucus Urine Present (A) None Seen /LPF    RBC Urine 2 <=2 /HPF    WBC Urine 2 <=5 /HPF    Squamous Epithelials Urine 4 (H) <=1 /HPF    Hyaline Casts Urine 1 <=2 /LPF     No results found for: HGBA1C  No results found for: IRON  No results found for: CKTOTAL, CKMB, TROPONINI  No results found for: TSH, R5XRDMH    Pertinent Radiology  Radiology Results:  Abdomen US, limited (RUQ only)    Result Date: 11/2/2022  EXAM: US ABDOMEN LIMITED LOCATION: [facility] DATE/TIME: 11/2/2022 12:51 AM INDICATION: Epigastric and right upper quadrant tenderness and pain, elevated bilirubin and LFTs. COMPARISON: None. TECHNIQUE: Limited abdominal ultrasound. FINDINGS: GALLBLADDER: There are several small gallstones in the dependent portion of the gallbladder. No gallbladder wall thickening or pericholecystic fluid. BILE DUCTS: No intrahepatic biliary dilatation. Common bile duct however is dilated up to 9 mm. LIVER: Increased echogenicity from diffuse fatty infiltration. No focal mass. RIGHT KIDNEY: No hydronephrosis. PANCREAS: Obscured by overlying bowel gas. No ascites.     IMPRESSION: 1.  Cholelithiasis without definite findings for cholecystitis. 2.  However, the common bile duct is dilated up to 9 mm.  Cannot exclude a distal common bile duct stone or obstructing process. Clinical correlation. MRCP would be helpful in further evaluation if indicated. 3.  Diffuse fatty infiltration of the liver. 4.  Pancreas obscured by overlying bowel gas.       EKG Results: not reviewed.   Echo: No results found.

## 2022-11-02 NOTE — DISCHARGE SUMMARY
[facility]    Discharge Summary  Hospitalist    Date of Admission:  11/2/2022  Date of Discharge:  11/2/2022  Discharging Provider: Carlos Cornell MD, MD  Date of Service (when I saw the patient): 11/02/22    Discharge Diagnoses   Cholelithiasis with probably passed stone    History of Present Illness  &Hospital Course   Lian Carney is an 27 year old female who presented with Patient is a 27-year-old woman presenting with epigastric pain that has been intermittently occurring for last several months.  Typically this is gotten better and relieved with Tums but last night this did not.  On evaluation she was found to have some stones in the gallbladder and 9 mm common bile duct.  Transaminases were slightly elevated but alk phos was normal and total bilirubin was marginally up at 1.4.  MRCP was performed which showed no common duct stones and a common duct diameter of 6 mm.  There was edema in the sanjay hepatis and surrounding gallbladder extending into the second duodenum which raises concern of possible cholecystitis.  Patient has received Zosyn.  Did discuss with GI but feel there is no ERCP or invention needed.  Follow-up liver test revealed slightly elevated alkaline phosphatase of 129 ALT of 316 AST of 217 which is slightly less than admission total bili 1.8 slightly up from 1.4.  Based on presentation and studies thus far patient likely passed a gallstone and we are seeing a slight bump in the LFTs.  At this time she is without pain and feeling much better.  She was seen in consultation by GI and general surgery.  She is opted for outpatient treatment and will follow up with surgery for future cholecystectomy.  Patient advised to return if fevers, increasing or intractable pain, nausea or vomiting.      Carlos Cornell MD, MD    Significant Results and Procedures   See above and below      Pending Results   These results will be followed up by No Ref-Primary, Physician  Unresulted  Labs Ordered in the Past 30 Days of this Admission     No orders found from 10/3/2022 to 11/3/2022.          Code Status   Full Code       Primary Care Physician   Physician No Ref-Primary    Physical Exam   Temp: 98.8  F (37.1  C) Temp src: Oral BP: (!) 185/108 Pulse: 68   Resp: 16 SpO2: 98 % O2 Device: None (Room air)    Vitals:    11/01/22 2023 11/02/22 0121   Weight: 120.2 kg (265 lb) 120.2 kg (265 lb)     Vital Signs with Ranges  Temp:  [98.8  F (37.1  C)] 98.8  F (37.1  C)  Pulse:  [67-80] 68  Resp:  [16-18] 16  BP: (173-247)/(107-156) 185/108  SpO2:  [97 %-99 %] 98 %  No intake/output data recorded.    Constitutional: Awake, alert, cooperative, no apparent distress.  Eyes: Conjunctiva and pupils examined and normal.  Respiratory: Clear to auscultation bilaterally, no crackles or wheezing.  Cardiovascular: Regular rate and rhythm, normal S1 and S2, and no murmur noted.  GI: Soft, non-distended, non-tender, normal bowel sounds.        Discharge Disposition   Discharged to home  Condition at discharge: Stable    Consultations This Hospital Stay   GASTROENTEROLOGY IP CONSULT  SURGERY GENERAL IP CONSULT    Time Spent on this Encounter   I, Carlos Cornell MD, personally saw the patient today and spent greater than 30 minutes discharging this patient.    Discharge Orders      Reason for your hospital stay    Passed gallstone.     Follow-up and recommended labs and tests     Follow up with Dr. Coy Adler , at (location with clinic name or city) , within 1-2 weeks  for hospital follow- up. Recommend follow up liver function enzymes     Activity    Your activity upon discharge: activity as tolerated     When to contact your care team    Call your primary doctor if you have any of the following: increasing or uncontrolled pain, nausea vomiting, fever >101.5.     Diet    Low fat     Discharge Medications   There are no discharge medications for this patient.    Allergies   No Known Allergies  Data   Most Recent  3 CBC's:Recent Labs   Lab Test 11/01/22 2105   WBC 7.1   HGB 13.4   MCV 85         Most Recent 3 BMP's:  Recent Labs   Lab Test 11/01/22 2105      POTASSIUM 4.0   CHLORIDE 101   CO2 29   BUN 9.2   CR 0.93   ANIONGAP 10   PHILIP 9.4   *     Most Recent 2 LFT's:  Recent Labs   Lab Test 11/02/22  1403 11/01/22  2105   * 360*   * 202*   ALKPHOS 129* 94   BILITOTAL 1.8* 1.4*     Most Recent INR's and Anticoagulation Dosing History:  Anticoagulation Dose History    There is no flowsheet data to display.       Most Recent 3 Troponin's:No lab results found.  Most Recent Cholesterol Panel:No lab results found.  Most Recent 6 Bacteria Isolates From Any Culture (See EPIC Reports for Culture Details):No lab results found.  Most Recent TSH, T4 and A1c Labs:No lab results found.  Results for orders placed or performed during the hospital encounter of 11/02/22   Abdomen US, limited (RUQ only)    Narrative    EXAM: US ABDOMEN LIMITED  LOCATION: [facility]  DATE/TIME: 11/2/2022 12:51 AM    INDICATION: Epigastric and right upper quadrant tenderness and pain, elevated bilirubin and LFTs.  COMPARISON: None.  TECHNIQUE: Limited abdominal ultrasound.    FINDINGS:    GALLBLADDER: There are several small gallstones in the dependent portion of the gallbladder. No gallbladder wall thickening or pericholecystic fluid.    BILE DUCTS: No intrahepatic biliary dilatation. Common bile duct however is dilated up to 9 mm.    LIVER: Increased echogenicity from diffuse fatty infiltration. No focal mass.    RIGHT KIDNEY: No hydronephrosis.    PANCREAS: Obscured by overlying bowel gas.    No ascites.      Impression    IMPRESSION:  1.  Cholelithiasis without definite findings for cholecystitis.    2.  However, the common bile duct is dilated up to 9 mm. Cannot exclude a distal common bile duct stone or obstructing process. Clinical correlation. MRCP would be helpful in further evaluation if  indicated.    3.  Diffuse fatty infiltration of the liver.     4.  Pancreas obscured by overlying bowel gas.       MR Abdomen MRCP without Contrast    Narrative    EXAM: MR ABDOMEN MRCP W/O CONTRAST  LOCATION: [facility]  DATE/TIME: 11/2/2022 2:19 AM    INDICATION: RUQ pain, tenderness, abnormal LFTs, dilated CBD, eval choledocholithiasis  COMPARISON: Ultrasound 11/2/2022.  TECHNIQUE: Routine MR liver/pancreas protocol including axial and coronal MRCP sequences. 2D and 3D reconstruction performed by MR technologist including MIP reconstruction and slab cholangiograms. If performed with contrast, additional dynamic T1 post   IV contrast images.  CONTRAST: None.     FINDINGS:     MRCP: There are several small stones in the gallbladder. There is edema in the sanjay hepatis and surrounding the gallbladder and extending to the second duodenum. The common bile duct is at the upper limits of normal at 6 mm diameter. No biliary ductal   stones. The pancreatic duct is normal in caliber throughout.    LIVER: Unremarkable.    PANCREAS: Unremarkable.    ADDITIONAL FINDINGS: The spleen, adrenal glands and kidneys are normal.. No abdominal lymph node enlargement. Trace amount of ascites at the inferior liver margin.      Impression    IMPRESSION:  1.  Cholelithiasis. There is edema in the right upper quadrant about the gallbladder and extending to the second duodenum. These findings suggest acute cholecystitis.  2.  There is no biliary dilatation or biliary ductal stone.  3.  Trace ascites.

## 2022-11-02 NOTE — ED PROVIDER NOTES
EMERGENCY DEPARTMENT ENCOUNTER      NAME: Lian Carney  AGE: 27 year old female  YOB: 1995  MRN: [id number]  EVALUATION DATE & TIME: No admission date for patient encounter.    PCP: No Ref-Primary, Physician    ED PROVIDER: Michael Duran M.D.      Chief Complaint   Patient presents with     Abdominal Pain       FINAL IMPRESSION:  1. Choledocholithiasis        ED COURSE & MEDICAL DECISION MAKIN year old female presents to the Emergency Department for evaluation of right upper quadrant and epigastric pain.  Patient has abnormal liver function tests with bilirubin elevated to 4.1 and moderate AST and ALT elevation.  She is tender in the right upper quadrant.  Ultrasound shows multiple mobile small gallstones and borderline dilated common bile duct.  Did review this finding with on-call GI provider who agreed with MRCP as next step in evaluation and also would like empiric antibiotics.  Zosyn was started here.  Patient also severely hypertensive, no history of this, does not appear to be completely driven by pain.  Was given a first dose of hydralazine here to start managing this as well.  Will be admitted to the hospitalist service for continued monitoring and consultations.  Discussed case with hospitalist    11:45 PM I met with the patient to gather history and to perform my initial exam. We discussed plans for the ED course, including diagnostic testing and treatment.   1:09 AM I spoke with GI, Dr. Kunz.   1:20 AM I spoke with the hospitalist, Dr. Pratt.    At the conclusion of the encounter I discussed the results of all of the tests and the disposition. The questions were answered. The patient or family acknowledged understanding and was agreeable with the care plan.       MEDICATIONS GIVEN IN THE EMERGENCY:  Medications   HYDROmorphone (PF) (DILAUDID) injection 0.2 mg (has no administration in time range)   lidocaine 1 % 0.1-1 mL (has no administration in time range)   lidocaine  (LMX4) cream (has no administration in time range)   sodium chloride (PF) 0.9% PF flush 3 mL (has no administration in time range)   sodium chloride (PF) 0.9% PF flush 3 mL (has no administration in time range)   melatonin tablet 1 mg (has no administration in time range)   dextrose 5% and 0.9% NaCl infusion (has no administration in time range)   acetaminophen (TYLENOL) tablet 650 mg (has no administration in time range)     Or   acetaminophen (TYLENOL) Suppository 650 mg (has no administration in time range)   oxyCODONE (ROXICODONE) tablet 5 mg (has no administration in time range)   famotidine (PEPCID) injection 20 mg (has no administration in time range)   prochlorperazine (COMPAZINE) injection 10 mg (has no administration in time range)     Or   prochlorperazine (COMPAZINE) tablet 10 mg (has no administration in time range)     Or   prochlorperazine (COMPAZINE) suppository 25 mg (has no administration in time range)   piperacillin-tazobactam (ZOSYN) 3.375 g vial to attach to  mL bag (has no administration in time range)   hydrALAZINE (APRESOLINE) injection 10 mg (has no administration in time range)   ondansetron (ZOFRAN) injection 4 mg (4 mg Intravenous Given 11/2/22 0017)   0.9% sodium chloride BOLUS (0 mLs Intravenous Stopped 11/2/22 0004)   oxyCODONE-acetaminophen (PERCOCET) 5-325 MG per tablet 1 tablet (1 tablet Oral Given 11/2/22 0019)   piperacillin-tazobactam (ZOSYN) 3.375 g vial to attach to  mL bag (3.375 g Intravenous Given 11/2/22 0141)   hydrALAZINE (APRESOLINE) injection 20 mg (20 mg Intravenous Given 11/2/22 0141)       NEW PRESCRIPTIONS STARTED AT TODAY'S ER VISIT  New Prescriptions    No medications on file          =================================================================    HPI    Patient information was obtained from: patient    Use of : N/A        Lian Carney is a 27 year old female with no pertinent history who presents to this ED via walk in with family  member for evaluation of abdominal pain.    Patient presents with abdominal pain that has been on and off for the past 4 months, but yesterday evening at 2000 and today at 0900 the abdominal pain was more noticeably worse. Patient reports the pain is now a constant sharp and stabbing pain that is located in the upper abdomen area. Patient reports she would usually take Tums with abdominal pain relief for her past abdominal pain, but this has not worked for her abdominal pain yesterday and today. She also reports vomiting. Patient denies fever, diarrhea, constipation, painful urination, and blood in urine.    Patient does not report of any other medical concerns or complaints at this time.      REVIEW OF SYSTEMS   All systems reviewed and negative except as noted in HPI.    PAST MEDICAL HISTORY:  History reviewed. No pertinent past medical history.    PAST SURGICAL HISTORY:  History reviewed. No pertinent surgical history.        CURRENT MEDICATIONS:    Current Facility-Administered Medications   Medication     acetaminophen (TYLENOL) tablet 650 mg    Or     acetaminophen (TYLENOL) Suppository 650 mg     dextrose 5% and 0.9% NaCl infusion     famotidine (PEPCID) injection 20 mg     hydrALAZINE (APRESOLINE) injection 10 mg     HYDROmorphone (PF) (DILAUDID) injection 0.2 mg     lidocaine (LMX4) cream     lidocaine 1 % 0.1-1 mL     melatonin tablet 1 mg     oxyCODONE (ROXICODONE) tablet 5 mg     piperacillin-tazobactam (ZOSYN) 3.375 g vial to attach to  mL bag     prochlorperazine (COMPAZINE) injection 10 mg    Or     prochlorperazine (COMPAZINE) tablet 10 mg    Or     prochlorperazine (COMPAZINE) suppository 25 mg     sodium chloride (PF) 0.9% PF flush 3 mL     sodium chloride (PF) 0.9% PF flush 3 mL     No current outpatient medications on file.         ALLERGIES:  No Known Allergies    FAMILY HISTORY:  History reviewed. No pertinent family history.    SOCIAL HISTORY:   Social History     Socioeconomic History  "    Marital status: Single   Tobacco Use     Smoking status: Never       VITALS:  BP (!) 182/112   Pulse 79   Temp 98.8  F (37.1  C) (Oral)   Resp 18   Ht 1.6 m (5' 3\")   Wt 120.2 kg (265 lb)   SpO2 98%   BMI 46.94 kg/m      PHYSICAL EXAM    Constitutional: Well developed, Well nourished, NAD.  HENT: Normocephalic, Atraumatic. Neck Supple.  Eyes: EOMI, Conjunctiva normal.  Respiratory: Breathing comfortably on room air. Speaks full sentences easily. Lungs clear to ascultation.  Cardiovascular: Normal heart rate, Regular rhythm. No peripheral edema.  Abdomen: Soft, epigastric and right upper quadrant tenderness to deep palpation  Musculoskeletal: Good range of motion in all major joints. No major deformities noted.  Integument: Warm, Dry.  Neurologic: Alert & awake, Normal motor function, Normal sensory function, No focal deficits noted.   Psychiatric: Cooperative. Affect appropriate.     LAB:  All pertinent labs reviewed and interpreted.  Labs Ordered and Resulted from Time of ED Arrival to Time of ED Departure   BASIC METABOLIC PANEL - Abnormal       Result Value    Sodium 140      Potassium 4.0      Chloride 101      Carbon Dioxide (CO2) 29      Anion Gap 10      Urea Nitrogen 9.2      Creatinine 0.93      Calcium 9.4      Glucose 115 (*)     GFR Estimate 87     HEPATIC FUNCTION PANEL - Abnormal    Protein Total 7.4      Albumin 4.3      Bilirubin Total 1.4 (*)     Alkaline Phosphatase 94       (*)      (*)     Bilirubin Direct 0.65 (*)    ROUTINE UA WITH MICROSCOPIC REFLEX TO CULTURE - Abnormal    Color Urine Yellow      Appearance Urine Clear      Glucose Urine Negative      Bilirubin Urine Negative      Ketones Urine Negative      Specific Gravity Urine 1.021      Blood Urine 0.03 mg/dL (*)     pH Urine 6.5      Protein Albumin Urine 20 (*)     Urobilinogen Urine 2.0 (*)     Nitrite Urine Negative      Leukocyte Esterase Urine Negative      Mucus Urine Present (*)     RBC Urine 2      WBC " Urine 2      Squamous Epithelials Urine 4 (*)     Hyaline Casts Urine 1     LIPASE - Normal    Lipase 33     HCG QUALITATIVE URINE - Normal    hCG Urine Qualitative Negative     CBC WITH PLATELETS AND DIFFERENTIAL    WBC Count 7.1      RBC Count 4.86      Hemoglobin 13.4      Hematocrit 41.3      MCV 85      MCH 27.6      MCHC 32.4      RDW 13.1      Platelet Count 327      % Neutrophils 69      % Lymphocytes 22      % Monocytes 8      % Eosinophils 1      % Basophils 0      % Immature Granulocytes 0      NRBCs per 100 WBC 0      Absolute Neutrophils 4.8      Absolute Lymphocytes 1.6      Absolute Monocytes 0.6      Absolute Eosinophils 0.0      Absolute Basophils 0.0      Absolute Immature Granulocytes 0.0      Absolute NRBCs 0.0     COVID-19 VIRUS (CORONAVIRUS) BY PCR       RADIOLOGY:  Reviewed all pertinent imaging. Please see official radiology report.  MR Abdomen MRCP without Contrast   Final Result   IMPRESSION:   1.  Cholelithiasis. There is edema in the right upper quadrant about the gallbladder and extending to the second duodenum. These findings suggest acute cholecystitis.   2.  There is no biliary dilatation or biliary ductal stone.   3.  Trace ascites.      Abdomen US, limited (RUQ only)   Final Result   IMPRESSION:   1.  Cholelithiasis without definite findings for cholecystitis.      2.  However, the common bile duct is dilated up to 9 mm. Cannot exclude a distal common bile duct stone or obstructing process. Clinical correlation. MRCP would be helpful in further evaluation if indicated.      3.  Diffuse fatty infiltration of the liver.       4.  Pancreas obscured by overlying bowel gas.                EKG:    Performed at: 21:09    Impression: Sinus rhythm. Normal ECG.    Rate: 70 BPM  Rhythm: Sinus rhythm  Axis: Normal  WA Interval: Normal  QRS Interval: Normal  QTc Interval: Normal  ST Changes: None  Comparison: No previous ECGs available.    I have independently reviewed and interpreted the EKG(s)  documented above.    I, Patricio Hercules, am serving as a scribe to document services personally performed by Dr. Michael Duran, based on my observation and the provider's statements to me. I, Michael Duran MD attest that Patricio Hercules is acting in a scribe capacity, has observed my performance of the services and has documented them in accordance with my direction.    Michael Duran M.D.  Emergency Medicine  [facility] EMERGENCY DEPARTMENT  [address] [place] [id number]  [phone]  Dept: [phone]     Michael Duran MD  11/02/22 0320

## 2022-11-02 NOTE — ED TRIAGE NOTES
Pt presents with abd pain that she has had for 3-4 months. She has never been seen for it because it has been intermittent. However pain became severe today and has been persistent. She has vomited 4-5 times. Denies any diarrhea. No known fevers. Of note her BP is very high in triage, denies any history of HTN. Pt is calm, denies any headache. Only meds taken today were tums this morning.

## 2022-11-02 NOTE — PROGRESS NOTES
Hospital medicine update  Patient is a 27-year-old woman presenting with epigastric pain that has been intermittently occurring for last several months.  Typically this is gotten better and relieved with Tums but last night this did not.  On evaluation she was found to have some stones in the gallbladder and 9 mm common bile duct.  Transaminases were slightly elevated but alk phos was normal and total bilirubin was marginally up at 1.4.  MRCP was performed which showed no common duct stones and a common duct diameter of 6 mm.  There was edema in the sanjay hepatis and surrounding gallbladder extending into the second duodenum which raises concern of possible cholecystitis.  Patient has received Zosyn.  Did discuss with GI but feel there is no ERCP or invention needed.  Talk to general surgery for consultation and next steps.    General surgical consult as above    Keep n.p.o. until neck steps are determined    Continue Zosyn for now    IV pain meds as needed    With the trend that transaminases slightly elevated its possible this could be due to a passed stone would follow and trend.  Will be due to fatty liver.    Blood pressure has been elevated but will trend for now, as needed hydralazine has been ordered for systolic greater than 200    Continue IV Pepcid for now.    Carlos Cornell MD

## 2022-11-02 NOTE — PHARMACY-ADMISSION MEDICATION HISTORY
Pharmacy Note - Admission Medication History    Pertinent Provider Information: No meds     ______________________________________________________________________    Prior To Admission (PTA) med list completed and updated in EMR.       No outpatient medications have been marked as taking for the 11/2/22 encounter (Hospital Encounter).       Information source(s): Patient and CareEverywhere/SureScripts  Method of interview communication: in-person    Summary of Changes to PTA Med List  New: N/A  Discontinued: N/A  Changed: N/A    Patient was asked about OTC/herbal products specifically.  PTA med list reflects this.    Allergies were reviewed, assessed, and updated with the patient.      Patient does not use any multi-dose medications prior to admission.    The information provided in this note is only as accurate as the sources available at the time of the update(s).    Thank you for the opportunity to participate in the care of this patient.    Inocencio Ariza [facility]  11/2/2022 7:27 AM

## 2022-11-03 ENCOUNTER — ANESTHESIA EVENT (OUTPATIENT)
Dept: SURGERY | Facility: HOSPITAL | Age: 27
End: 2022-11-03

## 2022-11-03 LAB
ALBUMIN SERPL BCG-MCNC: 3.6 G/DL (ref 3.5–5.2)
ALBUMIN SERPL BCG-MCNC: 3.8 G/DL (ref 3.5–5.2)
ALP SERPL-CCNC: 124 U/L (ref 35–104)
ALP SERPL-CCNC: 129 U/L (ref 35–104)
ALT SERPL W P-5'-P-CCNC: 226 U/L (ref 10–35)
ALT SERPL W P-5'-P-CCNC: 245 U/L (ref 10–35)
ANION GAP SERPL CALCULATED.3IONS-SCNC: 9 MMOL/L (ref 7–15)
AST SERPL W P-5'-P-CCNC: 106 U/L (ref 10–35)
AST SERPL W P-5'-P-CCNC: 132 U/L (ref 10–35)
BASOPHILS # BLD AUTO: 0 10E3/UL (ref 0–0.2)
BASOPHILS NFR BLD AUTO: 0 %
BILIRUB DIRECT SERPL-MCNC: 0.41 MG/DL (ref 0–0.3)
BILIRUB DIRECT SERPL-MCNC: 0.46 MG/DL (ref 0–0.3)
BILIRUB SERPL-MCNC: 1.2 MG/DL
BILIRUB SERPL-MCNC: 1.2 MG/DL
BUN SERPL-MCNC: 11 MG/DL (ref 6–20)
CALCIUM SERPL-MCNC: 8.3 MG/DL (ref 8.6–10)
CHLORIDE SERPL-SCNC: 106 MMOL/L (ref 98–107)
CREAT SERPL-MCNC: 0.85 MG/DL (ref 0.51–0.95)
DEPRECATED HCO3 PLAS-SCNC: 24 MMOL/L (ref 22–29)
EOSINOPHIL # BLD AUTO: 0 10E3/UL (ref 0–0.7)
EOSINOPHIL NFR BLD AUTO: 1 %
ERYTHROCYTE [DISTWIDTH] IN BLOOD BY AUTOMATED COUNT: 13.8 % (ref 10–15)
GFR SERPL CREATININE-BSD FRML MDRD: >90 ML/MIN/1.73M2
GLUCOSE SERPL-MCNC: 97 MG/DL (ref 70–99)
HCT VFR BLD AUTO: 40 % (ref 35–47)
HGB BLD-MCNC: 12.9 G/DL (ref 11.7–15.7)
IMM GRANULOCYTES # BLD: 0 10E3/UL
IMM GRANULOCYTES NFR BLD: 0 %
LIPASE SERPL-CCNC: >3000 U/L (ref 13–60)
LYMPHOCYTES # BLD AUTO: 1.5 10E3/UL (ref 0.8–5.3)
LYMPHOCYTES NFR BLD AUTO: 20 %
MCH RBC QN AUTO: 27.7 PG (ref 26.5–33)
MCHC RBC AUTO-ENTMCNC: 32.3 G/DL (ref 31.5–36.5)
MCV RBC AUTO: 86 FL (ref 78–100)
MONOCYTES # BLD AUTO: 0.5 10E3/UL (ref 0–1.3)
MONOCYTES NFR BLD AUTO: 6 %
NEUTROPHILS # BLD AUTO: 5.8 10E3/UL (ref 1.6–8.3)
NEUTROPHILS NFR BLD AUTO: 73 %
NRBC # BLD AUTO: 0 10E3/UL
NRBC BLD AUTO-RTO: 0 /100
PLATELET # BLD AUTO: 301 10E3/UL (ref 150–450)
POTASSIUM SERPL-SCNC: 4.1 MMOL/L (ref 3.4–5.3)
POTASSIUM SERPL-SCNC: 4.3 MMOL/L (ref 3.4–5.3)
PROT SERPL-MCNC: 6.3 G/DL (ref 6.4–8.3)
PROT SERPL-MCNC: 6.4 G/DL (ref 6.4–8.3)
RBC # BLD AUTO: 4.66 10E6/UL (ref 3.8–5.2)
SODIUM SERPL-SCNC: 139 MMOL/L (ref 136–145)
WBC # BLD AUTO: 7.9 10E3/UL (ref 4–11)

## 2022-11-03 PROCEDURE — 250N000011 HC RX IP 250 OP 636: Performed by: STUDENT IN AN ORGANIZED HEALTH CARE EDUCATION/TRAINING PROGRAM

## 2022-11-03 PROCEDURE — G0378 HOSPITAL OBSERVATION PER HR: HCPCS

## 2022-11-03 PROCEDURE — 96376 TX/PRO/DX INJ SAME DRUG ADON: CPT

## 2022-11-03 PROCEDURE — 84132 ASSAY OF SERUM POTASSIUM: CPT | Performed by: INTERNAL MEDICINE

## 2022-11-03 PROCEDURE — 82248 BILIRUBIN DIRECT: CPT | Performed by: INTERNAL MEDICINE

## 2022-11-03 PROCEDURE — 250N000011 HC RX IP 250 OP 636: Performed by: SURGERY

## 2022-11-03 PROCEDURE — 96365 THER/PROPH/DIAG IV INF INIT: CPT

## 2022-11-03 PROCEDURE — 36415 COLL VENOUS BLD VENIPUNCTURE: CPT | Performed by: INTERNAL MEDICINE

## 2022-11-03 PROCEDURE — 99226 PR SUBSEQUENT OBSERVATION CARE,LEVEL III: CPT | Performed by: INTERNAL MEDICINE

## 2022-11-03 PROCEDURE — 250N000011 HC RX IP 250 OP 636: Performed by: EMERGENCY MEDICINE

## 2022-11-03 PROCEDURE — 82248 BILIRUBIN DIRECT: CPT | Performed by: PHYSICIAN ASSISTANT

## 2022-11-03 PROCEDURE — 96366 THER/PROPH/DIAG IV INF ADDON: CPT

## 2022-11-03 PROCEDURE — 85025 COMPLETE CBC W/AUTO DIFF WBC: CPT | Performed by: INTERNAL MEDICINE

## 2022-11-03 PROCEDURE — 250N000011 HC RX IP 250 OP 636: Performed by: INTERNAL MEDICINE

## 2022-11-03 PROCEDURE — 250N000013 HC RX MED GY IP 250 OP 250 PS 637: Performed by: INTERNAL MEDICINE

## 2022-11-03 RX ORDER — HYDRALAZINE HYDROCHLORIDE 20 MG/ML
10 INJECTION INTRAMUSCULAR; INTRAVENOUS EVERY 6 HOURS PRN
Status: DISCONTINUED | OUTPATIENT
Start: 2022-11-03 | End: 2022-11-04

## 2022-11-03 RX ORDER — POTASSIUM CHLORIDE 1500 MG/1
40 TABLET, EXTENDED RELEASE ORAL ONCE
Status: COMPLETED | OUTPATIENT
Start: 2022-11-03 | End: 2022-11-03

## 2022-11-03 RX ADMIN — HYDRALAZINE HYDROCHLORIDE 10 MG: 20 INJECTION, SOLUTION INTRAMUSCULAR; INTRAVENOUS at 17:42

## 2022-11-03 RX ADMIN — HYDROMORPHONE HYDROCHLORIDE 0.5 MG: 1 INJECTION, SOLUTION INTRAMUSCULAR; INTRAVENOUS; SUBCUTANEOUS at 21:32

## 2022-11-03 RX ADMIN — ACETAMINOPHEN 650 MG: 325 TABLET, FILM COATED ORAL at 19:37

## 2022-11-03 RX ADMIN — POTASSIUM CHLORIDE 40 MEQ: 20 TABLET, EXTENDED RELEASE ORAL at 00:56

## 2022-11-03 RX ADMIN — POTASSIUM CHLORIDE AND SODIUM CHLORIDE: 900; 150 INJECTION, SOLUTION INTRAVENOUS at 00:15

## 2022-11-03 RX ADMIN — PIPERACILLIN AND TAZOBACTAM 3.38 G: 3; .375 INJECTION, POWDER, LYOPHILIZED, FOR SOLUTION INTRAVENOUS at 16:59

## 2022-11-03 RX ADMIN — HYDROMORPHONE HYDROCHLORIDE 0.5 MG: 1 INJECTION, SOLUTION INTRAMUSCULAR; INTRAVENOUS; SUBCUTANEOUS at 09:09

## 2022-11-03 RX ADMIN — POTASSIUM CHLORIDE AND SODIUM CHLORIDE: 900; 150 INJECTION, SOLUTION INTRAVENOUS at 11:16

## 2022-11-03 RX ADMIN — ACETAMINOPHEN 650 MG: 325 TABLET, FILM COATED ORAL at 13:47

## 2022-11-03 RX ADMIN — PIPERACILLIN AND TAZOBACTAM 3.38 G: 3; .375 INJECTION, POWDER, LYOPHILIZED, FOR SOLUTION INTRAVENOUS at 07:28

## 2022-11-03 RX ADMIN — HYDRALAZINE HYDROCHLORIDE 10 MG: 20 INJECTION, SOLUTION INTRAMUSCULAR; INTRAVENOUS at 00:13

## 2022-11-03 RX ADMIN — POTASSIUM CHLORIDE AND SODIUM CHLORIDE: 900; 150 INJECTION, SOLUTION INTRAVENOUS at 22:18

## 2022-11-03 ASSESSMENT — ACTIVITIES OF DAILY LIVING (ADL)
ADLS_ACUITY_SCORE: 35
DEPENDENT_IADLS:: INDEPENDENT
ADLS_ACUITY_SCORE: 35

## 2022-11-03 NOTE — H&P
[facility]    History and Physical - Hospitalist Service       Date of Admission:  11/2/2022    Assessment & Plan   Chief Complaint   Recurrent abdominal pain    History is obtained from the patient, electronic health record and emergency department physician    History of Present Illness   27 year old female who was recently assessed at [facility] ED for RUQ abdominal pain that had been intermittent for last several months. Was assessed with imaging, labs, consulted to GI and general surgery. Pain improved so patient went home, upon arriving home, ate some food with recurrent worsening pain so returned to ED. GI was contacted, and assesses that patient may need ERCP this admission.    On previous ED stay patient was found to have stones in the gallbladder and 9 mm common bile duct. Transaminases were slightly elevated but alk phos was normal and total bilirubin was marginally up at 1.4. MRCP was performed which showed no common duct stones and a common duct diameter of 6 mm.  There was edema in the sanjay hepatis and surrounding gallbladder extending into the second duodenum which raises concern of possible cholecystitis.  Patient received Zosyn.  Evaluated by GI, felt there was no urgent need for ERCP or other intervention.      Principal Problem:    Choledocholithiasis: With possible biliary infection or obstruction, pain recurrent.  Readmit to ED, n.p.o., IV fluids, pain control, Zosyn IV, reconsult GI, reconsult surgery.  Active Problems:    Hypokalemia: Several episodes of vomiting in the last 12 hours, replace.    Acute kidney injury (H): Due to poor oral intake, dehydration, possible effects of early infection    Bandemia: Onset since previous admission, initiate Zosyn, cultures drawn.    Review of Systems    The 5 point Review of Systems is negative other than noted in the HPI or here.    Code Status:  Full Code      Diet: Orders Placed This Encounter      NPO per Anesthesia  Guidelines for Procedure/Surgery Except for: Meds, Ice Chips      DVT prophylaxis:    Medical:  early ambulation    Mechanical:  PCD's    Rosales Catheter: Not present  Central Lines/Port-a-cath: Not present  Drains: Not present    Disposition Plan   Expected discharge:    Expected Discharge Date: 11/05/2022,  9:00 AM         recommended to Home once adequate pain management/ tolerating PO medications and antibiotic plan established.    The patient's care was discussed with the Bedside Nurse and Patient.    Carlos Brown MD  [facility]  Securely message with the Vocera Web Console (learn more here)  Text page via [employer] Paging/Directory         Clinically Significant Risk Factors Present on Admission        _____________________________________________________________________    Medical History  I have reviewed this patient's medical history and updated it with pertinent information if needed.  No past medical history on file.    Surgical History   I have reviewed this patient's surgical history and updated it with pertinent information if needed.  No past surgical history on file.    Social History   I have reviewed this patient's social history and updated it with pertinent information if needed.  Social History     Tobacco Use     Smoking status: Never       Family History   I have reviewed this patient's family history and updated it with pertinent information if needed.  No family history on file.    Prior to Admission Medications   No current facility-administered medications on file prior to encounter.  No current outpatient medications on file prior to encounter.      Allergies    No Known Allergies    Physical Exam   Vital signs:  Temp: 98.3  F (36.8  C) Temp src: Oral BP: (!) 172/87 Pulse: 66   Resp: 16 SpO2: 98 % O2 Device: None (Room air)        Estimated body mass index is 46.94 kg/m  as calculated from the following:    Height as of an earlier encounter on 11/2/22: 1.6 m (5'  "3\").    Weight as of an earlier encounter on 11/2/22: 120.2 kg (265 lb).    General: in no apparent distress and well developed and well nourished female lying in hospital bed oriented x3  HEENT: Head normocephalic atraumatic, oral mucosa moist. Sclerae anicteric  CV: Regular rhythm, normal rate, no murmurs  Resp: No wheezes, no rales or rhonchi, no focal consolidations  GI: Belly soft, nondistended, mildly tender to palpation RUQ, bowel sounds present  Skin: No rashes or lesions  Extremities: No peripheral edema  Psych: Normal affect, mood euthymic  Neuro: Grossly normal    Data   Data reviewed today: I reviewed all medications, new labs and imaging results over the last 24 hours.  Recent Labs   Lab 11/02/22  2238 11/02/22  1403 11/01/22  2105   WBC 12.8*  --  7.1   HGB 14.3  --  13.4   MCV 85  --  85     --  327     --  140   POTASSIUM 3.3*  --  4.0   CHLORIDE 100  --  101   CO2 28  --  29   BUN 9.8  --  9.2   CR 1.14*  --  0.93   ANIONGAP 9  --  10   PHILIP 8.7  --  9.4   GLC 87  --  115*   ALBUMIN 4.2 4.3 4.3   PROTTOTAL 7.2 7.4 7.4   BILITOTAL 2.4* 1.8* 1.4*   ALKPHOS 128* 129* 94   * 316* 202*   * 217* 360*   LIPASE >3,000*  --  33     "

## 2022-11-03 NOTE — CONSULTS
"GI CONSULT NOTE      Name: Lian Carney  Medical Record #: [id number]  YOB: 1995  Date of Admission: 11/2/2022  Date/Time: 11/3/2022/10:05 AM     CHIEF COMPLAINT: Abdominal pain    HISTORY OF PRESENT ILLNESS: We were asked to see Lian Carney by Dr Brown for evaluation of abdominal pain. Lian Carney is a 27 year old year old female with history of appendectomy and obesity who initially presented 11/1/2022 with abdominal pain.  Evaluation at that time included liver function tests that were abnormal with AST of 360, , direct bili 0.65 and total bilirubin of 1.4.  Lipase was unremarkable.  Abdominal ultrasound 11/2/22 showed cholelithiasis with a common bile duct of 9 mm as well as fatty liver.  MRCP on November 2, 2022 showed cholelithiasis and findings suggestive of acute cholecystitis but no biliary dilation or definite stone.  She was seen in consultation by general surgery who recommended laparoscopic cholecystectomy but the patient opted to pursue this as an outpatient and therefore was discharged to home.  To returning to home, she ate chicken and rice and subsequently developed more severe pain and vomiting.  She presented back to the ER where laboratory evaluation revealed an abnormal lipase elevated to 3000 with persistent liver function test abnormalities but improved from previous.  Liver function tests on repeat November 3 show ongoing improvement with AST of 132, , direct bili 0.46 and total bilirubin of 1.2.  He did have mild leukocytosis on 2 November with white blood cell count of 12.8.  She has been started on Zosyn.    She does describe having ongoing mild epigastric discomfort.  She had an emesis upon arrival to the ED, but no recurrent vomiting. No fever or chills. Her bowel habits have been \"normal.\"      She does not regularly drink alcohol.  She vapes.  She is not on any blood thinners.    REVIEW OF SYSTEMS (ROS): Complete review of systems negative other than listed " in HPI.    PAST MEDICAL HISTORY:  No past medical history on file.     FAMILY HISTORY:  No family history on file.    SOCIAL HISTORY:    MEDICATIONS PRIOR TO ADMISSION: (Not in a hospital admission)         ALLERGIES: Patient has no known allergies.    PHYSICAL EXAM:    BP (!) 166/92   Pulse 67   Temp 98.3  F (36.8  C) (Oral)   Resp 16   LMP 10/19/2022 (Approximate)   SpO2 96%     GENERAL: Pleasant, no obvious distress  NECK: Supple without adenopathy  EYES: No scleral icterus  LUNGS: Clear to auscultation bilaterally  HEART: S1S2, no lower extremity edema  ABDOMEN: Non-distended. Positive bowel sounds. Soft, mild epigastric tenderness with deep palpation.   MUSKULOSKELETAL:  Warm and well perfused, moves all extremities well  SKIN: No jaundice  NEUROLOGIC: Alert and oriented  PSYCHIATRIC: Normal affect    LAB DATA:  CMP Results:   Recent Labs   Lab Test 11/03/22  0911 11/03/22  0555 11/02/22 2238 11/02/22  1403 11/01/22  2105   NA  --  139 137  --  140   POTASSIUM  --  4.3  4.1 3.3*  --  4.0   CHLORIDE  --  106 100  --  101   CO2  --  24 28  --  29   ANIONGAP  --  9 9  --  10   GLC  --  97 87  --  115*   BUN  --  11.0 9.8  --  9.2   CR  --  0.85 1.14*  --  0.93   BILITOTAL 1.2 1.2 2.4*   < > 1.4*   ALKPHOS 124* 129* 128*   < > 94   * 245* 275*   < > 202*   * 132* 156*   < > 360*    < > = values in this interval not displayed.      CBC  Recent Labs   Lab 11/03/22  0911 11/02/22 2238 11/01/22  2105   WBC 7.9 12.8* 7.1   RBC 4.66 5.21* 4.86   HGB 12.9 14.3 13.4   HCT 40.0 44.4 41.3   MCV 86 85 85   MCH 27.7 27.4 27.6   MCHC 32.3 32.2 32.4   RDW 13.8 13.4 13.1    376 327     INRNo lab results found in last 7 days.   Lipase   Date Value Ref Range Status   11/02/2022 >3,000 (H) 13 - 60 U/L Final   11/01/2022 33 13 - 60 U/L Final       IMAGING:  EXAM: US ABDOMEN LIMITED  LOCATION: [facility]  DATE/TIME: 11/2/2022 12:51 AM     INDICATION: Epigastric and right upper  quadrant tenderness and pain, elevated bilirubin and LFTs.  COMPARISON: None.  TECHNIQUE: Limited abdominal ultrasound.     FINDINGS:     GALLBLADDER: There are several small gallstones in the dependent portion of the gallbladder. No gallbladder wall thickening or pericholecystic fluid.     BILE DUCTS: No intrahepatic biliary dilatation. Common bile duct however is dilated up to 9 mm.     LIVER: Increased echogenicity from diffuse fatty infiltration. No focal mass.     RIGHT KIDNEY: No hydronephrosis.     PANCREAS: Obscured by overlying bowel gas.     No ascites.                                                                      IMPRESSION:  1.  Cholelithiasis without definite findings for cholecystitis.     2.  However, the common bile duct is dilated up to 9 mm. Cannot exclude a distal common bile duct stone or obstructing process. Clinical correlation. MRCP would be helpful in further evaluation if indicated.     3.  Diffuse fatty infiltration of the liver.      4.  Pancreas obscured by overlying bowel gas.     EXAM: MR ABDOMEN MRCP W/O CONTRAST  LOCATION: [facility]  DATE/TIME: 11/2/2022 2:19 AM     INDICATION: RUQ pain, tenderness, abnormal LFTs, dilated CBD, eval choledocholithiasis  COMPARISON: Ultrasound 11/2/2022.  TECHNIQUE: Routine MR liver/pancreas protocol including axial and coronal MRCP sequences. 2D and 3D reconstruction performed by MR technologist including MIP reconstruction and slab cholangiograms. If performed with contrast, additional dynamic T1 post   IV contrast images.  CONTRAST: None.      FINDINGS:      MRCP: There are several small stones in the gallbladder. There is edema in the sanjay hepatis and surrounding the gallbladder and extending to the second duodenum. The common bile duct is at the upper limits of normal at 6 mm diameter. No biliary ductal   stones. The pancreatic duct is normal in caliber throughout.     LIVER: Unremarkable.     PANCREAS:  Unremarkable.     ADDITIONAL FINDINGS: The spleen, adrenal glands and kidneys are normal.. No abdominal lymph node enlargement. Trace amount of ascites at the inferior liver margin.                                                                      IMPRESSION:  1.  Cholelithiasis. There is edema in the right upper quadrant about the gallbladder and extending to the second duodenum. These findings suggest acute cholecystitis.  2.  There is no biliary dilatation or biliary ductal stone.  3.  Trace ascites.    ASSESSMENT:  Acute cholecystitis with gallstone pancreatitis-- This is a 28 yo female with history appendectomy and obesity who presented with abdominal pain and abnormal liver tests. Imaging included abdominal ultrasound that showed cholelithiasis and CBD of 9mm. MRCP did not show definite biliary dilation or ductal stone, but did show findings of acute cholecystitis. The patient discharged to home 11/2/22, but presented back to the hospital late 11/2/22 with postprandial abdominal pain with vomiting. There was mild leukocytosis and persistent LFT abnormalities as well as elevated lipase 3000 suggesting gallstone pancreatitis. Liver function tests are trending downward. Suspect the patient has passed a common bile duct stone. No plans for ERCP at this time. I reviewed this case with general surgery who will tentatively plan for lap cholecystectomy with possible IOC. Agree with continuation of Zosyn. Follow LFTs to normalization. Generous iv hydration, bowel rest, and pain control.     PLAN:  NPO.   Follow LFTs to normalization.   Generous iv hydration, pain control.   Agree with continuation of Zosyn and tentative plans for lap angy w/ possible IOC per general surgery.   No plans for ERCP at this time given improvement of LFTs and no evidence of choledocholithiasis on imaging.   Total time spent on this encounter =45 minutes.   Discussed with Dr. Skaggs who will also visit with the patient.                                                 Helena Arriaza PA-C  Thank you for the opportunity to participate in the care of this patient.   Please feel free to call me with any questions or concerns.  Phone number [phone].            [facility] GI attending addendum  Pt seen, examined and discussed with MADELEINE.  Agree with assessment and plan above.       PE: VS reviewed; Lungs CTA; CV RRR; ABD tender epigastric area; Neuro MS intact     Assessment/Plan  27-year-old with abdominal pain and elevated LFTs.  Initial MRCP revealing dilated duct but no clear stone.  She went home and then returned with severe pain and now has a markedly elevated lipase, pancreatitis.  LFTs increased but are now decreasing suggesting she may have passed a stone.  The plan is cholecystectomy with IOC.  ERCP only if positive IOC.    I spent 25 minutes reviewing the Epic chart, [facility] records, talking with and examining the patient, synthesizing the data and formulating and impression/recommendations.    Sina Skaggs MD  11/3/2022/3:35 PM

## 2022-11-03 NOTE — CONSULTS
"Care Management Initial Consult    General Information  Assessment completed with: Patient, Lian  Type of CM/SW Visit: Initial Assessment    Primary Care Provider verified and updated as needed: Yes   Readmission within the last 30 days: no previous admission in last 30 days      Reason for Consult: discharge planning  Advance Care Planning: Advance Care Planning Reviewed: no concerns identified          Communication Assessment  Patient's communication style: spoken language (English or Bilingual)             Cognitive  Cognitive/Neuro/Behavioral: WDL    Living Environment:   People in home: other (see comments) (\"adult nephew\")     Current living Arrangements:  (\"I live in a upper level of a duplex. I have a full flight of steps to get into my living area\")      Able to return to prior arrangements: yes       Family/Social Support:  Care provided by: self  Provides care for: no one     Sibling(s), Other (specify) (\"nephew, other family members\")          Description of Support System: Supportive, Involved    Support Assessment: Adequate family and caregiver support, Adequate social supports, Patient communicates needs well met    Current Resources:   Patient receiving home care services: No     Community Resources: None  Equipment currently used at home: none  Supplies currently used at home: Other (\"glasses\")    Employment/Financial:  Employment Status: employed full-time     Employment/ Comments: \"no  benefits\"  Financial Concerns:     Referral to Financial Worker: No       Lifestyle & Psychosocial Needs:  Social Determinants of Health     Tobacco Use: Unknown     Smoking Tobacco Use: Never     Smokeless Tobacco Use: Unknown     Passive Exposure: Not on file   Alcohol Use: Not on file   Financial Resource Strain: Not on file   Food Insecurity: Not on file   Transportation Needs: Not on file   Physical Activity: Not on file   Stress: Not on file   Social Connections: Not on file   Intimate " "Partner Violence: Not on file   Depression: Not on file   Housing Stability: Not on file       Functional Status:  Prior to admission patient needed assistance:   Dependent ADLs:: Independent, Ambulation-no assistive device  Dependent IADLs:: Independent  Assesssment of Functional Status: At functional baseline    Mental Health Status:          Chemical Dependency Status:                Values/Beliefs:  Spiritual, Cultural Beliefs, Quaker Practices, Values that affect care:                 Additional Information:  Lian lives in an upper level duplex with her adult nephew. \"I have a full flight of steps to get into my living area\".    She is independent with ADLs at baseline and drives and works full time.    Likely no discharge needs at this time.    Family to transport at discharge.    What is Observation was given. Patient was a downgrade from inpatient per UR staff.    Candace Elizalde RN      "

## 2022-11-03 NOTE — PROGRESS NOTES
General Surgery Progress Note:    Hospital Day # 1    ASSESSMENT:   1. Choledocholithiasis        Lian Carney is a 27 year old female with cholelithiasis and acute cholecystitis as well as readmission due to increased abdominal pain and findings of gallstone pancreatitis    PLAN:   -Unfortunately we may not have time for laparoscopic cholecystectomy however we will wait for GI to see patient.  Most likely needs an ERCP and then we will see how we can coordinate laparoscopic cholecystectomy most likely will be tomorrow however we will see if there is any chance we can also do today along with ERCP.  -Keep n.p.o. for now.  -We will continue to follow along  -Lfts trending down. Plans for Lap cholecystectomy with IOC in am. If positive IOC GI will be available for eRCP if needed at the same time.   OK clears until 5 am. Just needs 2 hours before surgery for clears.     SUBJECTIVE:   Lian Carney is currently resting in bed and states that she still in pain but is a little better now.  Pain is the same as before.  Yesterday after discussing with myself and Dr. Adler patient did want to proceed with laparoscopic cholecystectomy however after speaking to her father decided to wait and see how she does.  She did go home and have chicken and rice and some fruit.  She immediately had more pain and vomited often.  She returned back to the ER last night.  She is afebrile.  Her blood pressure is elevated.  Labs show increasing LFTs and now a new lipase over thousand.  Patient Vitals for the past 24 hrs:   BP Temp Temp src Pulse Resp SpO2   11/03/22 0600 (!) 166/92 -- -- 67 16 96 %   11/03/22 0400 (!) 159/90 -- -- 74 -- 96 %   11/03/22 0345 (!) 166/103 -- -- 80 -- 97 %   11/03/22 0200 (!) 190/90 -- -- 68 -- 96 %   11/03/22 0000 (!) 172/87 -- -- 66 16 98 %   11/02/22 2345 (!) 182/80 -- -- 70 16 98 %   11/02/22 2330 (!) 184/113 -- -- 65 16 97 %   11/02/22 2315 (!) 189/106 -- -- 69 16 96 %   11/02/22 2300 (!) 191/107 -- -- 67 16  95 %   11/02/22 2245 (!) 169/108 -- -- 69 16 96 %   11/02/22 2051 (!) 222/140 -- -- -- -- --   11/02/22 2050 -- 98.3  F (36.8  C) Oral 75 18 99 %       Physical Exam:  General: NAD, pleasant    ABD: Soft tenderness epigastric    Admission on 11/02/2022   Component Date Value     Sodium 11/02/2022 137      Potassium 11/02/2022 3.3 (L)      Chloride 11/02/2022 100      Carbon Dioxide (CO2) 11/02/2022 28      Anion Gap 11/02/2022 9      Urea Nitrogen 11/02/2022 9.8      Creatinine 11/02/2022 1.14 (H)      Calcium 11/02/2022 8.7      Glucose 11/02/2022 87      Alkaline Phosphatase 11/02/2022 128 (H)      AST 11/02/2022 156 (H)      ALT 11/02/2022 275 (H)      Protein Total 11/02/2022 7.2      Albumin 11/02/2022 4.2      Bilirubin Total 11/02/2022 2.4 (H)      GFR Estimate 11/02/2022 68      Lipase 11/02/2022 >3,000 (H)      Magnesium 11/02/2022 2.1      SARS CoV2 PCR 11/02/2022 Negative      WBC Count 11/02/2022 12.8 (H)      RBC Count 11/02/2022 5.21 (H)      Hemoglobin 11/02/2022 14.3      Hematocrit 11/02/2022 44.4      MCV 11/02/2022 85      MCH 11/02/2022 27.4      MCHC 11/02/2022 32.2      RDW 11/02/2022 13.4      Platelet Count 11/02/2022 376      % Neutrophils 11/02/2022 82      % Lymphocytes 11/02/2022 12      % Monocytes 11/02/2022 5      % Eosinophils 11/02/2022 1      % Basophils 11/02/2022 0      % Immature Granulocytes 11/02/2022 0      NRBCs per 100 WBC 11/02/2022 0      Absolute Neutrophils 11/02/2022 10.5 (H)      Absolute Lymphocytes 11/02/2022 1.5      Absolute Monocytes 11/02/2022 0.7      Absolute Eosinophils 11/02/2022 0.1      Absolute Basophils 11/02/2022 0.0      Absolute Immature Granul* 11/02/2022 0.1      Absolute NRBCs 11/02/2022 0.0      Potassium 11/03/2022 4.1    Admission on 11/02/2022, Discharged on 11/02/2022   Component Date Value     Sodium 11/01/2022 140      Potassium 11/01/2022 4.0      Chloride 11/01/2022 101      Carbon Dioxide (CO2) 11/01/2022 29      Anion Gap 11/01/2022 10       Urea Nitrogen 11/01/2022 9.2      Creatinine 11/01/2022 0.93      Calcium 11/01/2022 9.4      Glucose 11/01/2022 115 (H)      GFR Estimate 11/01/2022 87      Protein Total 11/01/2022 7.4      Albumin 11/01/2022 4.3      Bilirubin Total 11/01/2022 1.4 (H)      Alkaline Phosphatase 11/01/2022 94      AST 11/01/2022 360 (H)      ALT 11/01/2022 202 (H)      Bilirubin Direct 11/01/2022 0.65 (H)      Lipase 11/01/2022 33      hCG Urine Qualitative 11/01/2022 Negative      Color Urine 11/01/2022 Yellow      Appearance Urine 11/01/2022 Clear      Glucose Urine 11/01/2022 Negative      Bilirubin Urine 11/01/2022 Negative      Ketones Urine 11/01/2022 Negative      Specific Gravity Urine 11/01/2022 1.021      Blood Urine 11/01/2022 0.03 mg/dL (A)      pH Urine 11/01/2022 6.5      Protein Albumin Urine 11/01/2022 20 (A)      Urobilinogen Urine 11/01/2022 2.0 (A)      Nitrite Urine 11/01/2022 Negative      Leukocyte Esterase Urine 11/01/2022 Negative      Mucus Urine 11/01/2022 Present (A)      RBC Urine 11/01/2022 2      WBC Urine 11/01/2022 2      Squamous Epithelials Uri* 11/01/2022 4 (H)      Hyaline Casts Urine 11/01/2022 1      WBC Count 11/01/2022 7.1      RBC Count 11/01/2022 4.86      Hemoglobin 11/01/2022 13.4      Hematocrit 11/01/2022 41.3      MCV 11/01/2022 85      MCH 11/01/2022 27.6      MCHC 11/01/2022 32.4      RDW 11/01/2022 13.1      Platelet Count 11/01/2022 327      % Neutrophils 11/01/2022 69      % Lymphocytes 11/01/2022 22      % Monocytes 11/01/2022 8      % Eosinophils 11/01/2022 1      % Basophils 11/01/2022 0      % Immature Granulocytes 11/01/2022 0      NRBCs per 100 WBC 11/01/2022 0      Absolute Neutrophils 11/01/2022 4.8      Absolute Lymphocytes 11/01/2022 1.6      Absolute Monocytes 11/01/2022 0.6      Absolute Eosinophils 11/01/2022 0.0      Absolute Basophils 11/01/2022 0.0      Absolute Immature Granul* 11/01/2022 0.0      Absolute NRBCs 11/01/2022 0.0      SARS CoV2 PCR 11/02/2022  Negative      Protein Total 11/02/2022 7.4      Albumin 11/02/2022 4.3      Bilirubin Total 11/02/2022 1.8 (H)      Alkaline Phosphatase 11/02/2022 129 (H)      AST 11/02/2022 217 (H)      ALT 11/02/2022 316 (H)      Bilirubin Direct 11/02/2022 0.65 (H)         CASEY Mcneil  [facility]  [address] [place] [id number]  Phone- [phone]  Fax- [phone]

## 2022-11-03 NOTE — UTILIZATION REVIEW
"Inpatient to Observation note:    Admission Status; Secondary Review Determination     Under the authority of the Utilization Management Committee, the utilization review process indicated a secondary review on the above patient.  The review outcome is based on review of the medical records, discussions with staff, and applying clinical experience noted on the date of the review.          (x) Observation Status Appropriate - This patient does not meet hospital inpatient criteria and is placed in observation status. If this patient's primary payer is Medicare and was admitted as an inpatient, Condition Code 44 should be used and patient status changed to \"observation\".     RATIONALE FOR DETERMINATION     27 year old female who was recently assessed at [facility] ED for RUQ abdominal pain that had been intermittent for last several months. Was assessed with imaging, labs, consulted to GI and general surgery. Pain improved so patient went home, upon arriving home, ate some food with recurrent worsening pain so returned to ED. Now patient admitted for acute cholecystitis with gallstone pancreatitis.  Plan for laparoscopic cholecystectomy.       The severity of illness, intensity of service provided, expected LOS and risk for adverse outcome make the care appropriate for further observation; however, doesn't meet criteria for hospital inpatient admission. Dr Bardales notified of this determination.        The information on this document is developed by the utilization review team in order for the business office to ensure compliance.  This only denotes the appropriateness of proper admission status and does not reflect the quality of care rendered.         The definitions of Inpatient Status and Observation Status used in making the determination above are those provided in the CMS Coverage Manual, Chapter 1 and Chapter 6, section 70.4.      Sincerely,  Dilip Holman MD  Utilization Review  Physician Advisor  [facility] " Services.

## 2022-11-03 NOTE — ED NOTES
Pt was woken up by the IV pump alarm. She states that her pain is currently 5/10. She refused more pain meds.

## 2022-11-03 NOTE — PROGRESS NOTES
[facility]    Medicine Progress Note - Hospitalist Service    Date of Admission:  11/2/2022    Assessment & Plan       History of Present Illness   27 year old female who was recently assessed at [facility] ED for RUQ abdominal pain that had been intermittent for last several months. Was assessed with imaging, labs, consulted to GI and general surgery. Pain improved so patient went home, upon arriving home, ate some food with recurrent worsening pain so returned to ED. GI was contacted, and assesses that patient may need ERCP this admission.     On previous ED stay patient was found to have stones in the gallbladder and 9 mm common bile duct. Transaminases were slightly elevated but alk phos was normal and total bilirubin was marginally up at 1.4. MRCP was performed which showed no common duct stones and a common duct diameter of 6 mm.  There was edema in the sanjay hepatis and surrounding gallbladder extending into the second duodenum which raises concern of possible cholecystitis.  Patient received Zosyn.  Evaluated by GI, felt there was no urgent need for ERCP or other intervention.        11/3 :     Plan for lap angy following, no plans for ERCP  Surgery and GI following  NPO, on iv fluids, pain control, iv zosyn  Iv hydralazine prn for high BP        Not medically ready for discharge at this time.  Multi day stay      A/p :      Principal Problem:    Choledocholithiasis: With possible biliary infection or obstruction, pain recurrent.  Readmit to ED, n.p.o., IV fluids, pain control, Zosyn IV, reconsult GI, reconsult surgery.  Active Problems:    Hypokalemia: Several episodes of vomiting in the last 12 hours, replace.    Acute kidney injury (H): Due to poor oral intake, dehydration, possible effects of early infection    Bandemia: Onset since previous admission, initiate Zosyn, cultures drawn.          Diet: Clear Liquid Diet  NPO per Anesthesia Guidelines for Procedure/Surgery Except  "for: Meds    DVT Prophylaxis: Pneumatic Compression Devices  Rosales Catheter: Not present  Central Lines: None  Cardiac Monitoring: None  Code Status: Full Code      Disposition Plan      Expected Discharge Date: 11/04/2022                The patient's care was discussed with the Bedside Nurse, Patient and Patient's Family.    Cong Bardales MD  Hospitalist Service  [facility]  Securely message with the Vocera Web Console (learn more here)  Text page via [employer] Paging/Directory         Clinically Significant Risk Factors Present on Admission        # Hypokalemia: Lowest K = 3.3 mmol/L (Ref range: 3.4-5.3) in last 2 days, will replace as needed   # Hypocalcemia: Lowest Ca = 8.3 mg/dL (Ref range: 8.5 - 10.1 mg/dL) in last 2 days, will monitor and replace as appropriate             # Severe Obesity: Estimated body mass index is 46.94 kg/m  as calculated from the following:    Height as of an earlier encounter on 11/2/22: 1.6 m (5' 3\").    Weight as of an earlier encounter on 11/2/22: 120.2 kg (265 lb).           ______________________________________________________________________      Data reviewed today: I reviewed all medications, new labs and imaging results over the last 24 hours. I personally reviewed no images or EKG's today.    Physical Exam   Vital Signs: Temp: 98.7  F (37.1  C) Temp src: Oral BP: (!) 223/132 Pulse: 68   Resp: 18 SpO2: 99 % O2 Device: None (Room air)    Weight: 0 lbs 0 oz       GENERAL: The patient is not in any acute distressed. Awake and alert.  HEENT: Nonicteric sclerae, PERRLA, EOMI. Oropharynx clear. Moist mucous membranes. Conjunctivae appear well perfused.  HEART: Regular rate and rhythm without murmurs.  LUNGS: Clear to auscultation bilaterally. No wheezing or crackles.  ABDOMEN: Soft, positive bowel sounds, nontender.  SKIN: No rash, no excessive bruising, petechiae, or purpura.  EXTREMITIES : no rashes, no swelling in legs.  NEUROLOGIC: conscious and oriented, " follows commands, no obvious focal deficits.  ROS: All other systems negative       Data   Recent Labs   Lab 11/03/22  0911 11/03/22  0555 11/02/22  2238 11/02/22  1403 11/01/22  2105   WBC 7.9  --  12.8*  --  7.1   HGB 12.9  --  14.3  --  13.4   MCV 86  --  85  --  85     --  376  --  327   NA  --  139 137  --  140   POTASSIUM  --  4.3  4.1 3.3*  --  4.0   CHLORIDE  --  106 100  --  101   CO2  --  24 28  --  29   BUN  --  11.0 9.8  --  9.2   CR  --  0.85 1.14*  --  0.93   ANIONGAP  --  9 9  --  10   PHILIP  --  8.3* 8.7  --  9.4   GLC  --  97 87  --  115*   ALBUMIN 3.6 3.8 4.2   < > 4.3   PROTTOTAL 6.3* 6.4 7.2   < > 7.4   BILITOTAL 1.2 1.2 2.4*   < > 1.4*   ALKPHOS 124* 129* 128*   < > 94   * 245* 275*   < > 202*   * 132* 156*   < > 360*   LIPASE  --   --  >3,000*  --  33    < > = values in this interval not displayed.

## 2022-11-03 NOTE — ED TRIAGE NOTES
Pt arrives to triage ambulatory w/ father endorsing, abdominal pain was here yesterday for it and discharged and told if continued to come back. Right side abdominal pain and was dx'd w/ gallstones and increasing pain today 10/10. N/V continues in addition. Pt endorsing HTN yesterday. BP high in triage. Pt very uncomfortable

## 2022-11-03 NOTE — PLAN OF CARE
"  Problem: Plan of Care - These are the overarching goals to be used throughout the patient stay.    Goal: Plan of Care Review  Description: The Plan of Care Review/Shift note should be completed every shift.  The Outcome Evaluation is a brief statement about your assessment that the patient is improving, declining, or no change.  This information will be displayed automatically on your shift note.  Outcome: Progressing  Goal: Patient-Specific Goal (Individualized)  Description: You can add care plan individualizations to a care plan. Examples of Individualization might be:  \"Parent requests to be called daily at 9am for status\", \"I have a hard time hearing out of my right ear\", or \"Do not touch me to wake me up as it startles me\".  Outcome: Progressing  Goal: Absence of Hospital-Acquired Illness or Injury  Outcome: Progressing  Goal: Optimal Comfort and Wellbeing  Outcome: Progressing   Goal Outcome Evaluation:     Pt transferred to  4 at about 1400 and denied abdominal pain. Pt requested to drink fluids, clear liquids given and tolerating well.                  "

## 2022-11-03 NOTE — ED PROVIDER NOTES
EMERGENCY DEPARTMENT ENCOUNTER      NAME: Lian Carney  AGE: 27 year old female  YOB: 1995  MRN: [id number]  EVALUATION DATE & TIME: 11/2/2022  9:56 PM    PCP: No Ref-Primary, Physician    ED PROVIDER: Laci Thompson MD    Chief Complaint   Patient presents with     Abdominal Pain     FINAL IMPRESSION:  1. Choledocholithiasis        ED COURSE & MEDICAL DECISION MAKING:    Pertinent Labs & Imaging studies reviewed. (See chart for details)  27 year old female presents to the Emergency Department for evaluation of epigastric and right upper quadrant pain.  Patient was just discharged from the hospital earlier today after admission for biliary issues.  Was found to have cholelithiasis.  Elevated LFTs and bilirubin.  In the hospital she had MRCP.  Patient's bilirubin did trend upward from 1.4-1.8 AST trended downward from 360-217 at discharge ALT trended upward from 202 to 316.  In review of the medical records it seems patient's pain resolved and she opted for outpatient management of her gallbladder issues.  Patient reporting that after she returned home she ate food and had return of her symptoms but it was escalated in intensity compared to when she was admitted previously.  On examination patient appeared to be in acute distress due to pain she was actively vomiting blood pressure significantly elevated at 222/140.  Examination with focal epigastric and right upper quadrant pain to palpation.  I think her history and examination is consistent with progression of her biliary issues.  Ultimately I believe she will require readmission to the hospital for more definitive management.  We are repeating her laboratory testing while we work to get her symptoms under control.    11:17 PM  Patient is improved with medications.  BP is trending downward with symptoms coming under control.  LFTs are trending upward bilirubin now 2.4.  Clinical history examination work-up and recent hospital stay most likely  consistent with choledocholithiasis.  Case was discussed with general surgery and case was discussed with [facility].  Recommendations for n.p.o. after midnight.  Considerations for ERCP tomorrow which GI will help facilitate.  Plan of care for readmission to the hospital.    11:27 PM  Discussed with hospitalist service.    10:15 PM Introduced myself to the patient, obtained history of present illness, and performed initial physical exam at this time. PPE: Provider wore gloves, N95 mask    10:49 PM Spoke with Dr. Rosa, General Surgery, regarding the patient.     11:14 PM Spoke with Dr. Fleming, [facility], regarding the patient.     11:25 PM Spoke with Dr. Brown, Hospitalist, regarding the patient.    At the conclusion of the encounter I discussed the results of all of the tests and the disposition. The questions were answered. The patient or family acknowledged understanding and was agreeable with the care plan.      MEDICATIONS GIVEN IN THE EMERGENCY:  Medications   0.9% sodium chloride BOLUS (1,000 mLs Intravenous New Bag 11/2/22 2243)   hydrALAZINE (APRESOLINE) injection 10 mg (has no administration in time range)   ketorolac (TORADOL) injection 15 mg (15 mg Intravenous Given 11/2/22 2236)   HYDROmorphone (DILAUDID) injection 1 mg (1 mg Intravenous Given 11/2/22 2234)   ondansetron (ZOFRAN) injection 4 mg (4 mg Intravenous Given 11/2/22 2232)     =================================================================    HPI    Patient information was obtained from: the patient     Use of : N/A     Lian Carney is a 27 year old female with a medical history of cholethiasis who presents to this ED with her relative for evaluation of abdominal pain.    Per chart review, the patient was admitted on 11/2/22 for evaluation of choledocholithiasis. During this visit, the patient had an MR abdomen MRCP without contrast with results as follows:   1.  Cholelithiasis. There is edema in the right upper quadrant  about the gallbladder and extending to the second duodenum. These findings suggest acute cholecystitis.   2.  There is no biliary dilatation or biliary ductal stone.   3.  Trace ascites.   GI and general surgery were consulted and zosyn was started. The patient opted for outpatient treatment. She was pain-free on discharge. The patient was advised to return if she develops a fever, worsening pain, or nausea/vomiting.     The patient felt fine this morning following being discharged from the hospital. After eating, she took a nap and the pain arose again. The pain begins in her right upper abdomen and radiates into her chest and the remainder of her upper abdomen. She endorses that the pain is much worse than before and has secondary symptoms of chest pain, and vomiting. She denies having a fever and any other symptoms.    REVIEW OF SYSTEMS   Review of Systems   Constitutional: Negative for fever.   Gastrointestinal: Positive for abdominal pain (radiates into chest), nausea and vomiting.   All other systems reviewed and are negative.     PAST MEDICAL HISTORY:  Cholelithiasis  Abnormal LFTs    CURRENT MEDICATIONS:    No current outpatient medications on file.    ALLERGIES:  No Known Allergies    FAMILY HISTORY:  No family history on file.    SOCIAL HISTORY:   Social History     Socioeconomic History     Marital status: Single   Tobacco Use     Smoking status: Never     VITALS:  BP (!) 222/140 (BP Location: Left arm, Patient Position: Chair)   Pulse 75   Temp 98.3  F (36.8  C) (Oral)   Resp 18   LMP 10/19/2022 (Approximate)   SpO2 99%     PHYSICAL EXAM    PHYSICAL EXAM    Constitutional: Well developed, Well nourished, appears to be in acute distress due to pain.  HENT: Normocephalic, Atraumatic, Bilateral external ears normal, Oropharynx normal, mucous membranes moist, Nose normal. Neck-  Normal range of motion, No tenderness, Supple, No stridor.   Eyes: PERRL, EOMI, Conjunctiva normal, No discharge.    Respiratory: Normal breath sounds, No respiratory distress, No wheezing, Speaks full sentences easily. No cough.   Cardiovascular: Normal heart rate, Regular rhythm, No murmurs Chest wall nontender.    GI:  moderate to severe epigastric and right upper quadrant pain to palpation no distention  : No cva tenderness    Musculoskeletal: 2+ DP pulses. No edema. No cyanosis. Good range of motion in all major joints. No tenderness to palpation. No tenderness of the CTLS spine.   Integument: Warm, Dry, No erythema, No rash. No petechiae.   Neurologic: Alert & oriented x 3, Normal motor function, Normal sensory function, No focal deficits noted.   Psychiatric: Affect normal, Judgment normal, Mood normal. Cooperative.     LAB:  All pertinent labs reviewed and interpreted.  Results for orders placed or performed during the hospital encounter of 11/02/22   Comprehensive metabolic panel   Result Value Ref Range    Sodium 137 136 - 145 mmol/L    Potassium 3.3 (L) 3.4 - 5.3 mmol/L    Chloride 100 98 - 107 mmol/L    Carbon Dioxide (CO2) 28 22 - 29 mmol/L    Anion Gap 9 7 - 15 mmol/L    Urea Nitrogen 9.8 6.0 - 20.0 mg/dL    Creatinine 1.14 (H) 0.51 - 0.95 mg/dL    Calcium 8.7 8.6 - 10.0 mg/dL    Glucose 87 70 - 99 mg/dL    Alkaline Phosphatase 128 (H) 35 - 104 U/L     (H) 10 - 35 U/L     (H) 10 - 35 U/L    Protein Total 7.2 6.4 - 8.3 g/dL    Albumin 4.2 3.5 - 5.2 g/dL    Bilirubin Total 2.4 (H) <=1.2 mg/dL    GFR Estimate 68 >60 mL/min/1.73m2   Result Value Ref Range    Magnesium 2.1 1.7 - 2.3 mg/dL   CBC with platelets and differential   Result Value Ref Range    WBC Count 12.8 (H) 4.0 - 11.0 10e3/uL    RBC Count 5.21 (H) 3.80 - 5.20 10e6/uL    Hemoglobin 14.3 11.7 - 15.7 g/dL    Hematocrit 44.4 35.0 - 47.0 %    MCV 85 78 - 100 fL    MCH 27.4 26.5 - 33.0 pg    MCHC 32.2 31.5 - 36.5 g/dL    RDW 13.4 10.0 - 15.0 %    Platelet Count 376 150 - 450 10e3/uL    % Neutrophils 82 %    % Lymphocytes 12 %    % Monocytes 5 %     % Eosinophils 1 %    % Basophils 0 %    % Immature Granulocytes 0 %    NRBCs per 100 WBC 0 <1 /100    Absolute Neutrophils 10.5 (H) 1.6 - 8.3 10e3/uL    Absolute Lymphocytes 1.5 0.8 - 5.3 10e3/uL    Absolute Monocytes 0.7 0.0 - 1.3 10e3/uL    Absolute Eosinophils 0.1 0.0 - 0.7 10e3/uL    Absolute Basophils 0.0 0.0 - 0.2 10e3/uL    Absolute Immature Granulocytes 0.1 <=0.4 10e3/uL    Absolute NRBCs 0.0 10e3/uL     I, Marc Hackett, am serving as a scribe to document services personally performed by Marc Hackett based on my observation and the provider's statements to me. I, Laci Thompson, attest that Marc Hackett is acting in a scribe capacity, has observed my performance of the services and has documented them in accordance with my direction.    Laci Thompson MD  [facility] EMERGENCY DEPARTMENT  [address] [place] [id number] [phone]     Laci Thompson MD  11/02/22 2336

## 2022-11-04 ENCOUNTER — ANESTHESIA (OUTPATIENT)
Dept: SURGERY | Facility: HOSPITAL | Age: 27
End: 2022-11-04

## 2022-11-04 ENCOUNTER — APPOINTMENT (OUTPATIENT)
Dept: RADIOLOGY | Facility: HOSPITAL | Age: 27
End: 2022-11-04
Attending: SURGERY

## 2022-11-04 ENCOUNTER — PREP FOR PROCEDURE (OUTPATIENT)
Dept: SURGERY | Facility: CLINIC | Age: 27
End: 2022-11-04

## 2022-11-04 LAB
ALBUMIN SERPL BCG-MCNC: 3.9 G/DL (ref 3.5–5.2)
ALP SERPL-CCNC: 117 U/L (ref 35–104)
ALT SERPL W P-5'-P-CCNC: 184 U/L (ref 10–35)
ANION GAP SERPL CALCULATED.3IONS-SCNC: 9 MMOL/L (ref 7–15)
AST SERPL W P-5'-P-CCNC: 53 U/L (ref 10–35)
BASOPHILS # BLD AUTO: 0 10E3/UL (ref 0–0.2)
BASOPHILS NFR BLD AUTO: 0 %
BILIRUB DIRECT SERPL-MCNC: 0.31 MG/DL (ref 0–0.3)
BILIRUB SERPL-MCNC: 1.2 MG/DL
BUN SERPL-MCNC: 5 MG/DL (ref 6–20)
CALCIUM SERPL-MCNC: 8.7 MG/DL (ref 8.6–10)
CHLORIDE SERPL-SCNC: 98 MMOL/L (ref 98–107)
CREAT SERPL-MCNC: 0.68 MG/DL (ref 0.51–0.95)
DEPRECATED HCO3 PLAS-SCNC: 24 MMOL/L (ref 22–29)
EOSINOPHIL # BLD AUTO: 0 10E3/UL (ref 0–0.7)
EOSINOPHIL NFR BLD AUTO: 0 %
ERYTHROCYTE [DISTWIDTH] IN BLOOD BY AUTOMATED COUNT: 13.7 % (ref 10–15)
GFR SERPL CREATININE-BSD FRML MDRD: >90 ML/MIN/1.73M2
GLUCOSE SERPL-MCNC: 96 MG/DL (ref 70–99)
HCT VFR BLD AUTO: 40.9 % (ref 35–47)
HGB BLD-MCNC: 13.2 G/DL (ref 11.7–15.7)
IMM GRANULOCYTES # BLD: 0.1 10E3/UL
IMM GRANULOCYTES NFR BLD: 0 %
LYMPHOCYTES # BLD AUTO: 1.5 10E3/UL (ref 0.8–5.3)
LYMPHOCYTES NFR BLD AUTO: 13 %
MCH RBC QN AUTO: 27.5 PG (ref 26.5–33)
MCHC RBC AUTO-ENTMCNC: 32.3 G/DL (ref 31.5–36.5)
MCV RBC AUTO: 85 FL (ref 78–100)
MONOCYTES # BLD AUTO: 0.6 10E3/UL (ref 0–1.3)
MONOCYTES NFR BLD AUTO: 6 %
NEUTROPHILS # BLD AUTO: 9.6 10E3/UL (ref 1.6–8.3)
NEUTROPHILS NFR BLD AUTO: 81 %
NRBC # BLD AUTO: 0 10E3/UL
NRBC BLD AUTO-RTO: 0 /100
PLATELET # BLD AUTO: 285 10E3/UL (ref 150–450)
POTASSIUM SERPL-SCNC: 3.6 MMOL/L (ref 3.4–5.3)
PROT SERPL-MCNC: 6.8 G/DL (ref 6.4–8.3)
RBC # BLD AUTO: 4.8 10E6/UL (ref 3.8–5.2)
SODIUM SERPL-SCNC: 131 MMOL/L (ref 136–145)
WBC # BLD AUTO: 11.7 10E3/UL (ref 4–11)

## 2022-11-04 PROCEDURE — 255N000002 HC RX 255 OP 636: Performed by: SURGERY

## 2022-11-04 PROCEDURE — 250N000011 HC RX IP 250 OP 636: Performed by: INTERNAL MEDICINE

## 2022-11-04 PROCEDURE — 88304 TISSUE EXAM BY PATHOLOGIST: CPT | Mod: 26 | Performed by: PATHOLOGY

## 2022-11-04 PROCEDURE — 258N000003 HC RX IP 258 OP 636: Performed by: SURGERY

## 2022-11-04 PROCEDURE — 370N000017 HC ANESTHESIA TECHNICAL FEE, PER MIN: Performed by: SURGERY

## 2022-11-04 PROCEDURE — 258N000003 HC RX IP 258 OP 636: Performed by: NURSE ANESTHETIST, CERTIFIED REGISTERED

## 2022-11-04 PROCEDURE — 250N000009 HC RX 250: Performed by: ANESTHESIOLOGY

## 2022-11-04 PROCEDURE — 250N000011 HC RX IP 250 OP 636: Performed by: ANESTHESIOLOGY

## 2022-11-04 PROCEDURE — 272N000001 HC OR GENERAL SUPPLY STERILE: Performed by: SURGERY

## 2022-11-04 PROCEDURE — 250N000011 HC RX IP 250 OP 636: Performed by: SURGERY

## 2022-11-04 PROCEDURE — 999N000180 XR SURGERY CARM FLUORO LESS THAN 5 MIN

## 2022-11-04 PROCEDURE — 250N000013 HC RX MED GY IP 250 OP 250 PS 637: Performed by: SURGERY

## 2022-11-04 PROCEDURE — 360N000083 HC SURGERY LEVEL 3 W/ FLUORO, PER MIN: Performed by: SURGERY

## 2022-11-04 PROCEDURE — 258N000003 HC RX IP 258 OP 636: Performed by: ANESTHESIOLOGY

## 2022-11-04 PROCEDURE — 36415 COLL VENOUS BLD VENIPUNCTURE: CPT | Performed by: INTERNAL MEDICINE

## 2022-11-04 PROCEDURE — 99225 PR SUBSEQUENT OBSERVATION CARE,LEVEL II: CPT | Performed by: HOSPITALIST

## 2022-11-04 PROCEDURE — 85025 COMPLETE CBC W/AUTO DIFF WBC: CPT | Performed by: INTERNAL MEDICINE

## 2022-11-04 PROCEDURE — 250N000011 HC RX IP 250 OP 636: Performed by: HOSPITALIST

## 2022-11-04 PROCEDURE — 88304 TISSUE EXAM BY PATHOLOGIST: CPT | Mod: TC | Performed by: SURGERY

## 2022-11-04 PROCEDURE — 96366 THER/PROPH/DIAG IV INF ADDON: CPT | Mod: 59

## 2022-11-04 PROCEDURE — 250N000011 HC RX IP 250 OP 636: Performed by: STUDENT IN AN ORGANIZED HEALTH CARE EDUCATION/TRAINING PROGRAM

## 2022-11-04 PROCEDURE — 999N000141 HC STATISTIC PRE-PROCEDURE NURSING ASSESSMENT: Performed by: SURGERY

## 2022-11-04 PROCEDURE — 47563 LAPARO CHOLECYSTECTOMY/GRAPH: CPT | Performed by: SURGERY

## 2022-11-04 PROCEDURE — 96376 TX/PRO/DX INJ SAME DRUG ADON: CPT

## 2022-11-04 PROCEDURE — 82248 BILIRUBIN DIRECT: CPT | Performed by: PHYSICIAN ASSISTANT

## 2022-11-04 PROCEDURE — 250N000009 HC RX 250: Performed by: SURGERY

## 2022-11-04 PROCEDURE — 710N000009 HC RECOVERY PHASE 1, LEVEL 1, PER MIN: Performed by: SURGERY

## 2022-11-04 PROCEDURE — 250N000009 HC RX 250: Performed by: NURSE ANESTHETIST, CERTIFIED REGISTERED

## 2022-11-04 PROCEDURE — 250N000025 HC SEVOFLURANE, PER MIN: Performed by: SURGERY

## 2022-11-04 PROCEDURE — G0378 HOSPITAL OBSERVATION PER HR: HCPCS

## 2022-11-04 PROCEDURE — 250N000011 HC RX IP 250 OP 636: Performed by: NURSE ANESTHETIST, CERTIFIED REGISTERED

## 2022-11-04 RX ORDER — SCOLOPAMINE TRANSDERMAL SYSTEM 1 MG/1
1 PATCH, EXTENDED RELEASE TRANSDERMAL ONCE
Status: COMPLETED | OUTPATIENT
Start: 2022-11-04 | End: 2022-11-05

## 2022-11-04 RX ORDER — HALOPERIDOL 5 MG/ML
1 INJECTION INTRAMUSCULAR
Status: DISCONTINUED | OUTPATIENT
Start: 2022-11-04 | End: 2022-11-04 | Stop reason: HOSPADM

## 2022-11-04 RX ORDER — NALOXONE HYDROCHLORIDE 0.4 MG/ML
0.2 INJECTION, SOLUTION INTRAMUSCULAR; INTRAVENOUS; SUBCUTANEOUS
Status: DISCONTINUED | OUTPATIENT
Start: 2022-11-04 | End: 2022-11-06 | Stop reason: HOSPADM

## 2022-11-04 RX ORDER — SODIUM CHLORIDE, SODIUM LACTATE, POTASSIUM CHLORIDE, CALCIUM CHLORIDE 600; 310; 30; 20 MG/100ML; MG/100ML; MG/100ML; MG/100ML
INJECTION, SOLUTION INTRAVENOUS CONTINUOUS
Status: DISCONTINUED | OUTPATIENT
Start: 2022-11-04 | End: 2022-11-04 | Stop reason: HOSPADM

## 2022-11-04 RX ORDER — HYDRALAZINE HYDROCHLORIDE 20 MG/ML
10 INJECTION INTRAMUSCULAR; INTRAVENOUS ONCE
Status: COMPLETED | OUTPATIENT
Start: 2022-11-04 | End: 2022-11-04

## 2022-11-04 RX ORDER — PROPOFOL 10 MG/ML
INJECTION, EMULSION INTRAVENOUS PRN
Status: DISCONTINUED | OUTPATIENT
Start: 2022-11-04 | End: 2022-11-04

## 2022-11-04 RX ORDER — OXYCODONE HYDROCHLORIDE 5 MG/1
5 TABLET ORAL EVERY 4 HOURS PRN
Status: DISCONTINUED | OUTPATIENT
Start: 2022-11-04 | End: 2022-11-04 | Stop reason: HOSPADM

## 2022-11-04 RX ORDER — FENTANYL CITRATE 50 UG/ML
50 INJECTION, SOLUTION INTRAMUSCULAR; INTRAVENOUS EVERY 5 MIN PRN
Status: DISCONTINUED | OUTPATIENT
Start: 2022-11-04 | End: 2022-11-04 | Stop reason: HOSPADM

## 2022-11-04 RX ORDER — MEPERIDINE HYDROCHLORIDE 25 MG/ML
12.5 INJECTION INTRAMUSCULAR; INTRAVENOUS; SUBCUTANEOUS EVERY 5 MIN PRN
Status: DISCONTINUED | OUTPATIENT
Start: 2022-11-04 | End: 2022-11-04 | Stop reason: HOSPADM

## 2022-11-04 RX ORDER — LIDOCAINE 40 MG/G
CREAM TOPICAL
Status: DISCONTINUED | OUTPATIENT
Start: 2022-11-04 | End: 2022-11-04 | Stop reason: HOSPADM

## 2022-11-04 RX ORDER — ONDANSETRON 4 MG/1
4 TABLET, ORALLY DISINTEGRATING ORAL EVERY 30 MIN PRN
Status: DISCONTINUED | OUTPATIENT
Start: 2022-11-04 | End: 2022-11-04 | Stop reason: HOSPADM

## 2022-11-04 RX ORDER — HYDROMORPHONE HYDROCHLORIDE 1 MG/ML
0.2 INJECTION, SOLUTION INTRAMUSCULAR; INTRAVENOUS; SUBCUTANEOUS EVERY 5 MIN PRN
Status: DISCONTINUED | OUTPATIENT
Start: 2022-11-04 | End: 2022-11-04 | Stop reason: HOSPADM

## 2022-11-04 RX ORDER — LABETALOL HYDROCHLORIDE 5 MG/ML
10 INJECTION, SOLUTION INTRAVENOUS EVERY 4 HOURS PRN
Status: DISCONTINUED | OUTPATIENT
Start: 2022-11-04 | End: 2022-11-05

## 2022-11-04 RX ORDER — HYDRALAZINE HYDROCHLORIDE 20 MG/ML
10 INJECTION INTRAMUSCULAR; INTRAVENOUS EVERY 4 HOURS PRN
Status: DISCONTINUED | OUTPATIENT
Start: 2022-11-04 | End: 2022-11-06 | Stop reason: HOSPADM

## 2022-11-04 RX ORDER — ONDANSETRON 2 MG/ML
4 INJECTION INTRAMUSCULAR; INTRAVENOUS EVERY 30 MIN PRN
Status: DISCONTINUED | OUTPATIENT
Start: 2022-11-04 | End: 2022-11-04 | Stop reason: HOSPADM

## 2022-11-04 RX ORDER — NALOXONE HYDROCHLORIDE 0.4 MG/ML
0.4 INJECTION, SOLUTION INTRAMUSCULAR; INTRAVENOUS; SUBCUTANEOUS
Status: DISCONTINUED | OUTPATIENT
Start: 2022-11-04 | End: 2022-11-06 | Stop reason: HOSPADM

## 2022-11-04 RX ORDER — CEFAZOLIN SODIUM/WATER 3 G/30 ML
3 SYRINGE (ML) INTRAVENOUS SEE ADMIN INSTRUCTIONS
Status: DISCONTINUED | OUTPATIENT
Start: 2022-11-04 | End: 2022-11-04 | Stop reason: HOSPADM

## 2022-11-04 RX ORDER — ONDANSETRON 2 MG/ML
INJECTION INTRAMUSCULAR; INTRAVENOUS PRN
Status: DISCONTINUED | OUTPATIENT
Start: 2022-11-04 | End: 2022-11-04

## 2022-11-04 RX ORDER — FENTANYL CITRATE 50 UG/ML
INJECTION, SOLUTION INTRAMUSCULAR; INTRAVENOUS PRN
Status: DISCONTINUED | OUTPATIENT
Start: 2022-11-04 | End: 2022-11-04

## 2022-11-04 RX ORDER — BUPIVACAINE HYDROCHLORIDE AND EPINEPHRINE 2.5; 5 MG/ML; UG/ML
INJECTION, SOLUTION INFILTRATION; PERINEURAL PRN
Status: DISCONTINUED | OUTPATIENT
Start: 2022-11-04 | End: 2022-11-04 | Stop reason: HOSPADM

## 2022-11-04 RX ORDER — SODIUM CHLORIDE, SODIUM LACTATE, POTASSIUM CHLORIDE, AND CALCIUM CHLORIDE .6; .31; .03; .02 G/100ML; G/100ML; G/100ML; G/100ML
IRRIGANT IRRIGATION PRN
Status: DISCONTINUED | OUTPATIENT
Start: 2022-11-04 | End: 2022-11-04 | Stop reason: HOSPADM

## 2022-11-04 RX ORDER — CEFAZOLIN SODIUM/WATER 3 G/30 ML
3 SYRINGE (ML) INTRAVENOUS
Status: COMPLETED | OUTPATIENT
Start: 2022-11-04 | End: 2022-11-04

## 2022-11-04 RX ORDER — LABETALOL HYDROCHLORIDE 5 MG/ML
10 INJECTION, SOLUTION INTRAVENOUS ONCE
Status: DISCONTINUED | OUTPATIENT
Start: 2022-11-04 | End: 2022-11-04

## 2022-11-04 RX ORDER — LIDOCAINE HYDROCHLORIDE 10 MG/ML
INJECTION, SOLUTION INFILTRATION; PERINEURAL PRN
Status: DISCONTINUED | OUTPATIENT
Start: 2022-11-04 | End: 2022-11-04

## 2022-11-04 RX ORDER — DEXAMETHASONE SODIUM PHOSPHATE 10 MG/ML
INJECTION, SOLUTION INTRAMUSCULAR; INTRAVENOUS PRN
Status: DISCONTINUED | OUTPATIENT
Start: 2022-11-04 | End: 2022-11-04

## 2022-11-04 RX ADMIN — FENTANYL CITRATE 50 MCG: 50 INJECTION, SOLUTION INTRAMUSCULAR; INTRAVENOUS at 09:18

## 2022-11-04 RX ADMIN — HYDRALAZINE HYDROCHLORIDE 10 MG: 20 INJECTION, SOLUTION INTRAMUSCULAR; INTRAVENOUS at 00:14

## 2022-11-04 RX ADMIN — FENTANYL CITRATE 50 MCG: 50 INJECTION, SOLUTION INTRAMUSCULAR; INTRAVENOUS at 08:26

## 2022-11-04 RX ADMIN — SUGAMMADEX 200 MG: 100 INJECTION, SOLUTION INTRAVENOUS at 08:58

## 2022-11-04 RX ADMIN — PIPERACILLIN AND TAZOBACTAM 3.38 G: 3; .375 INJECTION, POWDER, LYOPHILIZED, FOR SOLUTION INTRAVENOUS at 17:02

## 2022-11-04 RX ADMIN — FENTANYL CITRATE 100 MCG: 50 INJECTION, SOLUTION INTRAMUSCULAR; INTRAVENOUS at 07:53

## 2022-11-04 RX ADMIN — PIPERACILLIN AND TAZOBACTAM 3.38 G: 3; .375 INJECTION, POWDER, LYOPHILIZED, FOR SOLUTION INTRAVENOUS at 00:15

## 2022-11-04 RX ADMIN — HYDRALAZINE HYDROCHLORIDE 10 MG: 20 INJECTION, SOLUTION INTRAMUSCULAR; INTRAVENOUS at 11:42

## 2022-11-04 RX ADMIN — Medication 100 MG: at 07:53

## 2022-11-04 RX ADMIN — HYDRALAZINE HYDROCHLORIDE 10 MG: 20 INJECTION, SOLUTION INTRAMUSCULAR; INTRAVENOUS at 03:51

## 2022-11-04 RX ADMIN — ONDANSETRON 4 MG: 2 INJECTION INTRAMUSCULAR; INTRAVENOUS at 08:50

## 2022-11-04 RX ADMIN — HYDRALAZINE HYDROCHLORIDE 10 MG: 20 INJECTION, SOLUTION INTRAMUSCULAR; INTRAVENOUS at 21:58

## 2022-11-04 RX ADMIN — ACETAMINOPHEN 650 MG: 325 TABLET, FILM COATED ORAL at 17:00

## 2022-11-04 RX ADMIN — HYDROMORPHONE HYDROCHLORIDE 0.5 MG: 1 INJECTION, SOLUTION INTRAMUSCULAR; INTRAVENOUS; SUBCUTANEOUS at 10:35

## 2022-11-04 RX ADMIN — Medication 3 G: at 07:44

## 2022-11-04 RX ADMIN — POTASSIUM CHLORIDE AND SODIUM CHLORIDE: 900; 150 INJECTION, SOLUTION INTRAVENOUS at 11:42

## 2022-11-04 RX ADMIN — ROCURONIUM BROMIDE 40 MG: 50 INJECTION, SOLUTION INTRAVENOUS at 08:09

## 2022-11-04 RX ADMIN — MIDAZOLAM 1 MG: 1 INJECTION INTRAMUSCULAR; INTRAVENOUS at 07:40

## 2022-11-04 RX ADMIN — PIPERACILLIN AND TAZOBACTAM 3.38 G: 3; .375 INJECTION, POWDER, LYOPHILIZED, FOR SOLUTION INTRAVENOUS at 08:15

## 2022-11-04 RX ADMIN — DEXAMETHASONE SODIUM PHOSPHATE 10 MG: 10 INJECTION, SOLUTION INTRAMUSCULAR; INTRAVENOUS at 07:53

## 2022-11-04 RX ADMIN — LIDOCAINE HYDROCHLORIDE 5 ML: 10 INJECTION, SOLUTION INFILTRATION; PERINEURAL at 07:53

## 2022-11-04 RX ADMIN — PROPOFOL 250 MG: 10 INJECTION, EMULSION INTRAVENOUS at 07:53

## 2022-11-04 RX ADMIN — FENTANYL CITRATE 50 MCG: 50 INJECTION, SOLUTION INTRAMUSCULAR; INTRAVENOUS at 09:30

## 2022-11-04 RX ADMIN — SODIUM CHLORIDE, POTASSIUM CHLORIDE, SODIUM LACTATE AND CALCIUM CHLORIDE: 600; 310; 30; 20 INJECTION, SOLUTION INTRAVENOUS at 06:37

## 2022-11-04 RX ADMIN — FENTANYL CITRATE 50 MCG: 50 INJECTION, SOLUTION INTRAMUSCULAR; INTRAVENOUS at 08:22

## 2022-11-04 RX ADMIN — PHENYLEPHRINE HYDROCHLORIDE 50 MCG: 10 INJECTION INTRAVENOUS at 07:53

## 2022-11-04 RX ADMIN — HYDROMORPHONE HYDROCHLORIDE 0.5 MG: 1 INJECTION, SOLUTION INTRAMUSCULAR; INTRAVENOUS; SUBCUTANEOUS at 00:14

## 2022-11-04 RX ADMIN — SCOPALAMINE 1 PATCH: 1 PATCH, EXTENDED RELEASE TRANSDERMAL at 07:07

## 2022-11-04 ASSESSMENT — ACTIVITIES OF DAILY LIVING (ADL)
ADLS_ACUITY_SCORE: 35
ADLS_ACUITY_SCORE: 31
ADLS_ACUITY_SCORE: 35
ADLS_ACUITY_SCORE: 31
ADLS_ACUITY_SCORE: 35
ADLS_ACUITY_SCORE: 31
ADLS_ACUITY_SCORE: 31

## 2022-11-04 ASSESSMENT — LIFESTYLE VARIABLES: TOBACCO_USE: 1

## 2022-11-04 NOTE — ANESTHESIA PROCEDURE NOTES
Airway       Patient location during procedure: OR       Procedure Start/Stop Times: 11/4/2022 7:56 AM  Staff -        Anesthesiologist:  Cb Goyal MD       CRNA: Luis Hidalgo APRN CRNA       Performed By: CRNA  Consent for Airway        Urgency: elective  Indications and Patient Condition       Indications for airway management: nick-procedural       Induction type:intravenous       Mask difficulty assessment: 2 - vent by mask + OA or adjuvant +/- NMBA    Final Airway Details       Final airway type: endotracheal airway       Successful airway: ETT - single  Endotracheal Airway Details        ETT size (mm): 7.0       Cuffed: yes       Cuff volume (mL): 8       Successful intubation technique: video laryngoscopy       VL Blade Size: Glidescope 3       Grade View of Cords: 1       Adjucts: stylet       Position: Right       Measured from: lips       Secured at (cm): 22       Bite block used: None    Post intubation assessment        Placement verified by: capnometry, equal breath sounds and chest rise        Number of attempts at approach: 1       Number of other approaches attempted: 0       Secured with: silk tape       Ease of procedure: easy       Dentition: Intact and Unchanged    Medication(s) Administered   Medication Administration Time: 11/4/2022 7:56 AM

## 2022-11-04 NOTE — ANESTHESIA PREPROCEDURE EVALUATION
Anesthesia Pre-Procedure Evaluation    Patient: Lian Carney   MRN: [id number] : 1995        Procedure : Procedure(s):  CHOLECYSTECTOMY, LAPAROSCOPIC  CHOLANGIOGRAMS  POSSIBLE ENDOSCOPIC RETROGRADE CHOLANGIOPANCREATOGRAPHY          Past Medical History:   Diagnosis Date     Obese       Past Surgical History:   Procedure Laterality Date     APPENDECTOMY        No Known Allergies   Social History     Tobacco Use     Smoking status: Never     Smokeless tobacco: Not on file   Substance Use Topics     Alcohol use: Yes     Comment: occational      Wt Readings from Last 1 Encounters:   22 117.9 kg (260 lb)        Anesthesia Evaluation   Pt has had prior anesthetic.         ROS/MED HX  ENT/Pulmonary:     (+) tobacco use,     Neurologic:     (+) migraines,     Cardiovascular:  - neg cardiovascular ROS     METS/Exercise Tolerance:     Hematologic:  - neg hematologic  ROS     Musculoskeletal:  - neg musculoskeletal ROS     GI/Hepatic: Comment: Currently nauseated with emesis     (+) GERD, cholecystitis/cholelithiasis,     Renal/Genitourinary:  - neg Renal ROS     Endo:     (+) Obesity,     Psychiatric/Substance Use:  - neg psychiatric ROS     Infectious Disease:  - neg infectious disease ROS     Malignancy:  - neg malignancy ROS     Other:  - neg other ROS          Physical Exam    Airway  airway exam normal      Mallampati: IV   TM distance: > 3 FB   Neck ROM: full   Mouth opening: > 3 cm    Respiratory Devices and Support         Dental  no notable dental history         Cardiovascular   cardiovascular exam normal       Rhythm and rate: regular and normal     Pulmonary   pulmonary exam normal        breath sounds clear to auscultation           OUTSIDE LABS:  CBC:   Lab Results   Component Value Date    WBC 11.7 (H) 2022    WBC 7.9 2022    HGB 13.2 2022    HGB 12.9 2022    HCT 40.9 2022    HCT 40.0 2022     2022     2022     BMP:   Lab Results    Component Value Date     (L) 11/04/2022     11/03/2022    POTASSIUM 3.6 11/04/2022    POTASSIUM 4.1 11/03/2022    POTASSIUM 4.3 11/03/2022    CHLORIDE 98 11/04/2022    CHLORIDE 106 11/03/2022    CO2 24 11/04/2022    CO2 24 11/03/2022    BUN 5.0 (L) 11/04/2022    BUN 11.0 11/03/2022    CR 0.68 11/04/2022    CR 0.85 11/03/2022    GLC 96 11/04/2022    GLC 97 11/03/2022     COAGS: No results found for: PTT, INR, FIBR  POC:   Lab Results   Component Value Date    HCG Negative 11/01/2022     HEPATIC:   Lab Results   Component Value Date    ALBUMIN 3.9 11/04/2022    PROTTOTAL 6.8 11/04/2022     (H) 11/04/2022    AST 53 (H) 11/04/2022    ALKPHOS 117 (H) 11/04/2022    BILITOTAL 1.2 11/04/2022     OTHER:   Lab Results   Component Value Date    PHILIP 8.7 11/04/2022    MAG 2.1 11/02/2022    LIPASE >3,000 (H) 11/02/2022       Anesthesia Plan    ASA Status:  3   NPO Status:  NPO Appropriate    Anesthesia Type: General.     - Airway: ETT   Induction: Intravenous, RSI, Propofol.   Maintenance: Balanced.   Techniques and Equipment:     - Airway: Video-Laryngoscope         Consents    Anesthesia Plan(s) and associated risks, benefits, and realistic alternatives discussed. Questions answered and patient/representative(s) expressed understanding.    - Discussed:     - Discussed with:  Patient         Postoperative Care    Pain management: IV analgesics, Oral pain medications, Multi-modal analgesia.   PONV prophylaxis: Ondansetron (or other 5HT-3), Dexamethasone or Solumedrol, Droperidol or Haldol, Background Propofol Infusion     Comments:                JOSIAH CONN MD

## 2022-11-04 NOTE — PROVIDER NOTIFICATION
Notified MD at 16:05 PM regarding changes in vital signs.  B/p continues to be elevated. Now at 205/105. IVF running at 100ml/hr and next dose of prn hydralazine can be given at 17:40. Can we slow rate down?    Spoke with: Text message to Hospitalist Dr. Rodriguez via [email or web address]    Orders: Obtained - PRN hydralazine frequency increased. Additional PRN labetalol if B/P not responding to hydralazine.

## 2022-11-04 NOTE — PROGRESS NOTES
"[facility] Hospitalist Progress Note  [facility]  Admission date: 11/2/2022    Summary:    27F with gallstone pancreatits now s/p cholecystectomy.  Intra-op cholangiogram without obstruction.  Acute cholecystitis surgically.      Assessment/Plan    #gallstone pancreatitis  #acute cholecystitis  -pain control,  still on zosyn and could probably stop tomorrow unless surgery wants to continue.     #hyponatremia - IVF    #HTN - no hx of such.  At risk for it.  PRNs for now.    Morbid obesity Body mass index is 46.06 kg/m .      Checklist:  Code Status: Full Code    Diet: NPO per Anesthesia Guidelines for Procedure/Surgery Except for: Meds    Rosales Catheter: Not present  Central Lines: None  DVT px:  Pneumatic Compression Devices        Auto-populated based on system request - if relevant they will be addressed above:  Clinically Significant Risk Factors Present on Admission        # Hypokalemia: Lowest K = 3.3 mmol/L (Ref range: 3.4-5.3) in last 2 days, will replace as needed  # Hyponatremia: Lowest Na = 131 mmol/L (Ref range: 136-145) in last 2 days, will monitor as appropriate  # Hypocalcemia: Lowest Ca = 8.3 mg/dL (Ref range: 8.5 - 10.1 mg/dL) in last 2 days, will monitor and replace as appropriate             # Severe Obesity: Estimated body mass index is 46.06 kg/m  as calculated from the following:    Height as of this encounter: 1.6 m (5' 3\").    Weight as of this encounter: 117.9 kg (260 lb).           Auto-populated from discharge tab:     Expected Discharge Date: 11/04/2022                 Overnight Events/Subjective/Notable results:    Abdominal pain much improved.  asymptoamtic from hypertension    4 point ROS otherwise negative    Objective    Vital signs in last 24 hours  Temp:  [98.6  F (37  C)-99.5  F (37.5  C)] 98.6  F (37  C)  Pulse:  [67-95] 95  Resp:  [18] 18  BP: (154-228)/() 154/95  SpO2:  [98 %-99 %] 99 % O2 Device: None (Room air)    Weight:   260 lbs 0 oz    Intake/Output last " 3 shifts  I/O last 3 completed shifts:  In: 2133 [P.O.:240; I.V.:1893]  Out: 800 [Urine:800]  Body mass index is 46.06 kg/m .    Physical Exam  General:  Alert, cooperative, no distress    Neurologic:  oriented, facial symmetry preserved, fluent speech.   Psych: calm, mood and affect appropriate to situation  HEENT:  Anicteric, MMM  CV: RRR no MRG, normal S1 and S2, no edema  Lungs: CTAB.  Easyrespirations  Abd: soft, obese, NT, normoactive BS  Skin: no rashes or jaundice noted on exposed skin.        I have reviewed all labs, medications, imaging studies in the last 24 hours.  Pertinent findings&changes discussed above.    Data       Lubna Rodriguez MD, [facility]  Internal Medicine Hospitalist

## 2022-11-04 NOTE — H&P
GENERAL PRE-PROCEDURE:   Procedure:  ERCP - Choledocholithiasis (if found on IOC)    Verbal consent obtained?: Yes    Written consent obtained?: Yes    Risks and benefits: Risks, benefits and alternatives were discussed    Consent given by:  Patient  Patient states understanding of procedure being performed: Yes    Patient's understanding of procedure matches consent: Yes    Procedure consent matches procedure scheduled: Yes    Expected level of sedation:  Deep  Appropriately NPO:  Yes  ASA Class:  3  Mallampati  :  Grade 2- soft palate, base of uvula, tonsillar pillars, and portion of posterior pharyngeal wall visible  Lungs:  Lungs clear with good breath sounds bilaterally  Heart:  Normal heart sounds and rate  History & Physical reviewed:  History and physical reviewed and no updates needed  Statement of review:  I have reviewed the lab findings, diagnostic data, medications, and the plan for sedation

## 2022-11-04 NOTE — OP NOTE
General surgery operative note    Date of surgery: 11/4/2022    Surgeon: Coy Adler MD    Preoperative diagnosis: Choledocholithiasis, gallstone pancreatitis    Postoperative diagnosis: Gallstone pancreatitis, acute cholecystitis    Procedure:  1.  Laparoscopic cholecystectomy  2.  Intraoperative cholangiogram    EBL: 5 cc    Findings: Mild acute inflammation of the gallbladder and surrounding structures.  Cholangiogram was negative for any obstructing lesions.    Indications:  Patient is a 27-year-old woman who presented to the ED with epigastric and right upper quadrant pain.  She was found to have elevated LFTs.  Surgery was initially offered but patient declined as she was feeling better.  She represented with recurrent symptoms this time with gallstone pancreatitis.  Decision was made to proceed to the operating room.  Discussion about surgery was had with the patient and she consented.    Details of operation:  Patient was brought to the operating room and placed on the table in the supine position.  General anesthesia was induced without incident.  The patient was prepped and draped in usual sterile fashion.  A timeout for safety was performed.    I began with a left upper quadrant Veress needle entry and the abdomen was insufflated without incident.  A 5 mm incision was made above the umbilicus and a an optical trocar was inserted at this site.  There was no injury with entry.  12 mm port was placed in the subxiphoid region and 2 5 mm ports were placed in the right subcostal region.  There was some edema and inflammation in the tissues surrounding the gallbladder.  The gallbladder was itself did appear to be a little inflamed as well.  The gallbladder was grasped and retracted cephalad.  Blunt dissection and electrocautery was used to achieve the critical view of safety.  The cystic artery was then clipped and divided.  We then proceeded to perform the cholangiogram.  A small nick was made in the  cystic duct.  A taut catheter was introduced and secured.  C-arm was brought into place and a cholangiogram was performed.  I did not see any obstruction anywhere in the biliary tract.  We then remove the catheter and proceeded to complete the cholecystectomy.  The cystic duct was clipped and divided.  Electrocautery was used to remove the gallbladder from the liver bed.  The gallbladder was placed in Endo Catch bag.  Hemostasis was excellent.  Suction  was used to clean the small amount of bile that leaked out of the cystic ductotomy.  The Endo Catch bag was then removed from the abdomen.  The 12 mm port site fascia was closed using 0 Vicryl suture.  The abdomen was desufflated and the ports were removed.  The skin was closed using a 4-0 Vicryl suture and Exofin was used as a dressing.  Patient was awakened anesthesia brought to the recovery room.    Coy Adler MD  General Surgeon  [facility]  Surgery [address] [address]?  Office: [phone]

## 2022-11-04 NOTE — PLAN OF CARE
Problem: Pain Acute  Goal: Optimal Pain Control and Function  Outcome: Progressing  Intervention: Develop Pain Management Plan  Recent Flowsheet Documentation  Taken 11/4/2022 0313 by Michelle Robertson RN  Pain Management Interventions: declines  Taken 11/4/2022 0014 by Michelle Robertson RN  Pain Management Interventions: medication (see MAR)  Intervention: Prevent or Manage Pain  Recent Flowsheet Documentation  Taken 11/4/2022 0500 by Michelle Robertson RN  Medication Review/Management: medications reviewed  Taken 11/4/2022 0014 by Michelle Robertson RN  Medication Review/Management: medications reviewed     Problem: Hypertension Acute  Goal: Blood Pressure Within Desired Range  Outcome: Progressing  Intervention: Normalize Blood Pressure  Recent Flowsheet Documentation  Taken 11/4/2022 0500 by Michelle Robertson RN  Medication Review/Management: medications reviewed  Taken 11/4/2022 0014 by Michelle Robertson RN  Medication Review/Management: medications reviewed   Goal Outcome Evaluation:    Complained of abdominal/ epigastric pain- dilaudid prn IV given- pain was resolved.  /109- PRN Hydralazine IV given, rechecked- 180/105. HO notified. Additional hydralazine IV 10 mg x 1. Rechecked BP after 2nd dose of hydralazine- BP-169/89  NPO instructed- for surgery this am.

## 2022-11-04 NOTE — ANESTHESIA PROCEDURE NOTES
Airway       Patient location during procedure: OR       Procedure Start/Stop Times: 11/4/2022 7:56 AM  Staff -        Anesthesiologist:  Cb Goyal MD       CRNA: Luis Hidalgo APRN CRNA       Performed By: CRNA  Consent for Airway        Urgency: elective  Indications and Patient Condition       Indications for airway management: nick-procedural       Induction type:RSI       Mask difficulty assessment: 0 - not attempted    Final Airway Details       Final airway type: endotracheal airway       Successful airway: ETT - single  Endotracheal Airway Details        ETT size (mm): 7.0       Cuffed: yes       Successful intubation technique: video laryngoscopy       VL Blade Size: Glidescope 3       Grade View of Cords: 1       Position: Right       Measured from: lips       Secured at (cm): 22       Bite block used: None    Post intubation assessment        Placement verified by: capnometry, equal breath sounds and chest rise        Number of attempts at approach: 1       Secured with: silk tape       Ease of procedure: easy       Dentition: Intact    Medication(s) Administered   Medication Administration Time: 11/4/2022 7:56 AM    Additional Comments       Preoxygenated x5 min. Airway clear on intubation - no gastric contents.

## 2022-11-04 NOTE — ANESTHESIA POSTPROCEDURE EVALUATION
Patient: Lian Carney    Procedure: Procedure(s):  CHOLECYSTECTOMY, LAPAROSCOPIC  CHOLANGIOGRAMS  POSSIBLE ENDOSCOPIC RETROGRADE CHOLANGIOPANCREATOGRAPHY       Anesthesia Type:  General    Note:  Disposition: Inpatient   Postop Pain Control: Uneventful            Sign Out: Well controlled pain   PONV: No   Neuro/Psych: Uneventful            Sign Out: Acceptable/Baseline neuro status   Airway/Respiratory: Uneventful            Sign Out: Acceptable/Baseline resp. status   CV/Hemodynamics: Uneventful            Sign Out: Acceptable CV status; No obvious hypovolemia; No obvious fluid overload   Other NRE: NONE   DID A NON-ROUTINE EVENT OCCUR? No           Last vitals:  Vitals Value Taken Time   /98 11/04/22 0950   Temp 37  C (98.6  F) 11/04/22 0950   Pulse 92 11/04/22 0956   Resp 16 11/04/22 0956   SpO2 96 % 11/04/22 0956   Vitals shown include unvalidated device data.    Electronically Signed By: JOSIAH CONN MD  November 4, 2022  1:23 PM

## 2022-11-04 NOTE — PLAN OF CARE
Goal Outcome Evaluation:  Very pleasant, appreciative 27 year old. Speaks good English and declines .  Patient reporting epigastric pain of 5, gave tylenol with little to no relief. Gave dose of dilaudid at 2134 and pain now a 2.  On Clear liquids- patient tried broth and jello but found that is made her slightly nauseous so stopped. NPO at 5am for scheduled surgery tomorrow.  IVF hydration. On IV Zosyn.  K 4.3-recheck in am.  Elevated Bps. Has prn hydralazine ordered every 6 hours. Last dose given at 1742 prior to admission to P2.  Up independently to BR. Voiding well.

## 2022-11-04 NOTE — ANESTHESIA CARE TRANSFER NOTE
Patient: Lian Carney    Procedure: Procedure(s):  CHOLECYSTECTOMY, LAPAROSCOPIC  CHOLANGIOGRAMS  POSSIBLE ENDOSCOPIC RETROGRADE CHOLANGIOPANCREATOGRAPHY       Diagnosis: Choledocholithiasis [K80.50]  Diagnosis Additional Information: No value filed.    Anesthesia Type:   General     Note:    Oropharynx: oropharynx clear of all foreign objects and spontaneously breathing  Level of Consciousness: awake  Oxygen Supplementation: face mask  Level of Supplemental Oxygen (L/min / FiO2): 6  Independent Airway: airway patency satisfactory and stable  Dentition: dentition unchanged  Vital Signs Stable: post-procedure vital signs reviewed and stable  Report to RN Given: handoff report given  Patient transferred to: PACU  Comments: Oropharynx suctioned. spont resp. Follows commands. Extubated. Exchanging well. To PACU. Report to RN at bedside. Pt is awake and answering questions appropriately.   Handoff Report: Identifed the Patient, Identified the Reponsible Provider, Reviewed the pertinent medical history, Discussed the surgical course, Reviewed Intra-OP anesthesia mangement and issues during anesthesia, Set expectations for post-procedure period and Allowed opportunity for questions and acknowledgement of understanding      Vitals:  Vitals Value Taken Time   /98 11/04/22 0950   Temp 37  C (98.6  F) 11/04/22 0950   Pulse 92 11/04/22 0956   Resp 16 11/04/22 0956   SpO2 96 % 11/04/22 0956   Vitals shown include unvalidated device data.    Electronically Signed By: SUKHWINDER Rodriguez CRNA  November 4, 2022  2:09 PM

## 2022-11-04 NOTE — PLAN OF CARE
Patient pain controlled with 1 dose of IV dilaudid 0.5 mg, brought pain down to 4/10 which is tolerable. Ice in place as well. Up voiding urine without issues. Tolerating clears so far. Lap sites JOSUE, clean dry and intact. BP has been elevated, IV hydralazine given. Updated MD Rodriguez x2, waiting for response on second page now that BP continues to be elevated, patient is asymptomatic. Will continue to monitor.     Lillie Burleson RN 11/4/2022 3:43 PM

## 2022-11-05 LAB
ALBUMIN SERPL BCG-MCNC: 3.6 G/DL (ref 3.5–5.2)
ALP SERPL-CCNC: 95 U/L (ref 35–104)
ALT SERPL W P-5'-P-CCNC: 117 U/L (ref 10–35)
AST SERPL W P-5'-P-CCNC: 40 U/L (ref 10–35)
BILIRUB DIRECT SERPL-MCNC: 0.24 MG/DL (ref 0–0.3)
BILIRUB SERPL-MCNC: 0.9 MG/DL
POTASSIUM SERPL-SCNC: 3.5 MMOL/L (ref 3.4–5.3)
PROT SERPL-MCNC: 6.5 G/DL (ref 6.4–8.3)
SODIUM SERPL-SCNC: 138 MMOL/L (ref 136–145)

## 2022-11-05 PROCEDURE — 250N000011 HC RX IP 250 OP 636: Performed by: HOSPITALIST

## 2022-11-05 PROCEDURE — 80076 HEPATIC FUNCTION PANEL: CPT | Performed by: SURGERY

## 2022-11-05 PROCEDURE — 96376 TX/PRO/DX INJ SAME DRUG ADON: CPT

## 2022-11-05 PROCEDURE — 99225 PR SUBSEQUENT OBSERVATION CARE,LEVEL II: CPT | Performed by: STUDENT IN AN ORGANIZED HEALTH CARE EDUCATION/TRAINING PROGRAM

## 2022-11-05 PROCEDURE — 250N000011 HC RX IP 250 OP 636: Performed by: SURGERY

## 2022-11-05 PROCEDURE — G0378 HOSPITAL OBSERVATION PER HR: HCPCS

## 2022-11-05 PROCEDURE — 250N000011 HC RX IP 250 OP 636: Performed by: NURSE PRACTITIONER

## 2022-11-05 PROCEDURE — 36415 COLL VENOUS BLD VENIPUNCTURE: CPT | Performed by: STUDENT IN AN ORGANIZED HEALTH CARE EDUCATION/TRAINING PROGRAM

## 2022-11-05 PROCEDURE — 99024 POSTOP FOLLOW-UP VISIT: CPT | Performed by: NURSE PRACTITIONER

## 2022-11-05 PROCEDURE — 258N000003 HC RX IP 258 OP 636: Performed by: STUDENT IN AN ORGANIZED HEALTH CARE EDUCATION/TRAINING PROGRAM

## 2022-11-05 PROCEDURE — 250N000013 HC RX MED GY IP 250 OP 250 PS 637: Performed by: STUDENT IN AN ORGANIZED HEALTH CARE EDUCATION/TRAINING PROGRAM

## 2022-11-05 PROCEDURE — 84295 ASSAY OF SERUM SODIUM: CPT | Performed by: STUDENT IN AN ORGANIZED HEALTH CARE EDUCATION/TRAINING PROGRAM

## 2022-11-05 PROCEDURE — 36415 COLL VENOUS BLD VENIPUNCTURE: CPT | Performed by: SURGERY

## 2022-11-05 PROCEDURE — 84132 ASSAY OF SERUM POTASSIUM: CPT | Performed by: SURGERY

## 2022-11-05 RX ORDER — LISINOPRIL 5 MG/1
10 TABLET ORAL DAILY
Status: DISCONTINUED | OUTPATIENT
Start: 2022-11-05 | End: 2022-11-06

## 2022-11-05 RX ORDER — KETOROLAC TROMETHAMINE 15 MG/ML
15 INJECTION, SOLUTION INTRAMUSCULAR; INTRAVENOUS EVERY 6 HOURS
Status: DISCONTINUED | OUTPATIENT
Start: 2022-11-05 | End: 2022-11-06

## 2022-11-05 RX ORDER — OXYCODONE HYDROCHLORIDE 5 MG/1
5 TABLET ORAL EVERY 4 HOURS PRN
Status: DISCONTINUED | OUTPATIENT
Start: 2022-11-05 | End: 2022-11-06 | Stop reason: HOSPADM

## 2022-11-05 RX ADMIN — PIPERACILLIN AND TAZOBACTAM 3.38 G: 3; .375 INJECTION, POWDER, LYOPHILIZED, FOR SOLUTION INTRAVENOUS at 00:16

## 2022-11-05 RX ADMIN — LISINOPRIL 10 MG: 5 TABLET ORAL at 11:08

## 2022-11-05 RX ADMIN — PIPERACILLIN AND TAZOBACTAM 3.38 G: 3; .375 INJECTION, POWDER, LYOPHILIZED, FOR SOLUTION INTRAVENOUS at 08:26

## 2022-11-05 RX ADMIN — KETOROLAC TROMETHAMINE 15 MG: 15 INJECTION, SOLUTION INTRAMUSCULAR; INTRAVENOUS at 08:34

## 2022-11-05 RX ADMIN — HYDROMORPHONE HYDROCHLORIDE 0.5 MG: 1 INJECTION, SOLUTION INTRAMUSCULAR; INTRAVENOUS; SUBCUTANEOUS at 04:19

## 2022-11-05 RX ADMIN — HYDRALAZINE HYDROCHLORIDE 10 MG: 20 INJECTION, SOLUTION INTRAMUSCULAR; INTRAVENOUS at 02:06

## 2022-11-05 RX ADMIN — POTASSIUM CHLORIDE AND SODIUM CHLORIDE: 900; 150 INJECTION, SOLUTION INTRAVENOUS at 00:03

## 2022-11-05 RX ADMIN — KETOROLAC TROMETHAMINE 15 MG: 15 INJECTION, SOLUTION INTRAMUSCULAR; INTRAVENOUS at 13:40

## 2022-11-05 RX ADMIN — SODIUM CHLORIDE 100 ML: 9 INJECTION, SOLUTION INTRAVENOUS at 11:08

## 2022-11-05 RX ADMIN — PIPERACILLIN AND TAZOBACTAM 3.38 G: 3; .375 INJECTION, POWDER, LYOPHILIZED, FOR SOLUTION INTRAVENOUS at 16:51

## 2022-11-05 ASSESSMENT — ACTIVITIES OF DAILY LIVING (ADL)
ADLS_ACUITY_SCORE: 31

## 2022-11-05 NOTE — PLAN OF CARE
PRIMARY DIAGNOSIS: ACUTE PAIN  OUTPATIENT/OBSERVATION GOALS TO BE MET BEFORE DISCHARGE:  1. Pain Status: Pain free.    2. Return to near baseline physical activity: Yes    3. Cleared for discharge by consultants (if involved): No    Discharge Planner Nurse   Safe discharge environment identified: Yes  Barriers to discharge: Yes       Entered by: Malcolm Edward RN 11/05/2022 11:26 AM   Pt is alert and oriented x4. Pt is med complaint. Pt denies pain. Pt has high Bp and received Bp med for it. Pt is up independent no complain. Continue to monitor pt..   Please review provider order for any additional goals.   Nurse to notify provider when observation goals have been met and patient is ready for discharge.Goal Outcome Evaluation:

## 2022-11-05 NOTE — PLAN OF CARE
PRIMARY DIAGNOSIS: ACUTE PAIN  OUTPATIENT/OBSERVATION GOALS TO BE MET BEFORE DISCHARGE:  1. Pain Status: Pain free.    2. Return to near baseline physical activity: Yes    3. Cleared for discharge by consultants (if involved): No    Discharge Planner Nurse   Safe discharge environment identified: Yes  Barriers to discharge: Yes       Entered by: Malcolm Edward RN 11/05/2022 9:22 AM     Please review provider order for any additional goals.   Nurse to notify provider when observation goals have been met and patient is ready for discharge.Goal Outcome Evaluation:

## 2022-11-05 NOTE — DISCHARGE INSTRUCTIONS
From your Surgeon:    Follow up:  For straightforward laparoscopic procedures, our practice is to check-in with you over the phone a few days after your procedure.  If you would like a scheduled follow up appointment in clinic, please call us at [phone] to schedule an appointment at your convenience.  If you would prefer to follow up with us further by phone, please let us know so that we may contact you 2-3 weeks following your procedure.        Diet: Regular diet. Patients can have difficulty with constipation following surgery, due in part to the administration of narcotic medications.  If you are suffering with constipation, you should avoid foods such as hard cheeses or red meat.  Foods high in fiber are recommended.      Activity: You should continue to be active at home, including ambulating frequently.  If possible try to limit the amount of time spent in bed.    Restrictions: You should not lift greater than 15 pounds for 2 weeks, and will want to avoid strenuous physical activity for 1-2 weeks.  You should limit your physical activity if it causes you discomfort; however, this should resolve within 1-2 weeks.   Walking does not count as strenuous physical activity.  You are safe to walk up and down stairs.  Following 2 weeks you may resume all normal physical activity.    Work:  You can return to work once your surgical pain has resolved.  If you perform duties that require lifting, pushing or pulling anything greater than 15 pounds, then you would have to be on light duty for the immediate 2 weeks after your surgery (if your work allows light duty).  After 2 weeks, you can return to work without restrictions.    Wound care: It is normal to have a small rim of red present around the incisions. This should not, however, extend beyond 1/4 inch from the incision.  If your incisions become increasingly tender, red, or draining, please contact us.       Bathing: You may shower after 24 hours from surgery.   It is ok to get your incisions wet, but avoid rubbing them.  Avoid soaking in bath tubs, or swimming in lakes, pools, or streams for 2 weeks following surgery.

## 2022-11-05 NOTE — CARE PLAN
"PRIMARY DIAGNOSIS: \"GENERIC\" NURSING  OUTPATIENT/OBSERVATION GOALS TO BE MET BEFORE DISCHARGE:  1. ADLs back to baseline: Yes    2. Activity and level of assistance: Ambulating independently.    3. Pain status: Improved-controlled with oral pain medications. Headache improving, but still present.    4. Return to near baseline physical activity: Yes     Ate some rice and beef from home. Was nervous to eat. Tolerated well. Biggest complaint has been the headache and being hot/warm. Aside from blood pressure, vitals have been stable. Passing gas. Minimal pain to abdomen, more like discomfort.     Discharge Planner Nurse   Safe discharge environment identified: Yes  Barriers to discharge: Yes - Elevated blood pressure.        Entered by: Carlene Barber RN 11/04/2022 11:07 PM     Please review provider order for any additional goals.   Nurse to notify provider when observation goals have been met and patient is ready for discharge.  "

## 2022-11-05 NOTE — PLAN OF CARE
Problem: Hypertension Acute  Goal: Blood Pressure Within Desired Range  Outcome: Progressing  Intervention: Normalize Blood Pressure  Recent Flowsheet Documentation  Taken 11/5/2022 0000 by Michelle Robertson RN  Medication Review/Management: medications reviewed     Problem: Pain Acute  Goal: Optimal Pain Control and Function  Outcome: Progressing  Intervention: Develop Pain Management Plan  Recent Flowsheet Documentation  Taken 11/5/2022 0419 by Michelle Robertson RN  Pain Management Interventions: medication (see MAR)  Intervention: Prevent or Manage Pain  Recent Flowsheet Documentation  Taken 11/5/2022 0000 by Michelle Robertson RN  Medication Review/Management: medications reviewed     Goal Outcome Evaluation:  minimal headache and refused tylenol when offered.  SBP- 180's midnight- HO aware, prn hydralazine not due at this time. No additional treatment ordered, to wait till 2am to recheck BP and if still elevated, can give prn hydralazine  2am- SBP- 180's prn hydralazine given. Denies pain  4am- 180/106- complained of surgical pain, refused tylenol. Requested dilaudid IV- given and pain was resolved.  6am- BP-157/88. NO discomfort at this time

## 2022-11-05 NOTE — PROGRESS NOTES
"[facility]    Medicine Progress Note - Hospitalist Service    Date of Admission:  11/2/2022    Assessment & Plan   History of Present Illness     27 year old female who was admitted for choledocholithiasis s/p lap angy (11/4/22).    choledocholithiasis s/p lap cholecystectomy (11/4/22)  elevated liver enzymes - improving  leukocytosis  Alk phos peaked at 129, now wnl. Tbili peaked at 0.65, now wnl. AST and ALT trending down. No need for further abx.  -gen surg recs for post-op care  -zosyn (11/2-11/5)  -pain regimen: acetaminophen, dilaudid, oxycodone PRN    HTN  -start lisinopril 10mg  -optimize pain control  -IV hydralazine PRN  -will need a PCP to follow up with     hyponatremia  Na of 131 on 11/4. Want to see if her Na can remain wnl w/o IVF.   -trend Na  -discontinued IVF        hypokalemia, mild  -replacement protocol    RORY - resolved  -CTM         severe obesity  Estimated body mass index is 46.06 kg/m  as calculated from the following:    Height as of this encounter: 1.6 m (5' 3\").    Weight as of this encounter: 117.9 kg (260 lb).       Diet: Regular Diet Adult    DVT Prophylaxis: Pneumatic Compression Devices  Rosales Catheter: Not present  Central Lines: None  Cardiac Monitoring: None  Code Status: Full Code      Disposition Plan   Likely discharge on 11/6 in AM if Na and BP are not concerning     The patient's care was discussed with the Patient.    Marjorie Rogers MD  Hospitalist Service  [facility]  Securely message with the Vocera Web Console (learn more here)  Text page via [employer] Paging/Directory    ______________________________________________________________________    Interval History   Patient has continued to have high blood pressure. No vision changes but patient had been having a headache the previous day. She is tolerating PO intake. No nausea or vomiting. Ambulating without difficulty. Denies any CP or SOB. Surgical pain is well " controlled.    Data reviewed today: I reviewed all medications, new labs and imaging results over the last 24 hours.     Physical Exam   Vital Signs: Temp: 98.8  F (37.1  C) Temp src: Oral BP: (!) 161/92 Pulse: 88   Resp: 20 SpO2: 97 % O2 Device: None (Room air)    Weight: 260 lbs 0 oz     General Appearance: Laying in bed, no acute distress  Respiratory: CTAB, no increased WOB  Cardiovascular: RRR, no murmur appreciated  GI: soft, nontender; bowel sounds present  Skin: WWP  Other: appropriate mood and affect    Data   Recent Labs   Lab 11/05/22  0606 11/04/22  0451 11/03/22  0911 11/03/22  0555 11/02/22  2238 11/02/22  1403 11/01/22  2105   WBC  --  11.7* 7.9  --  12.8*  --  7.1   HGB  --  13.2 12.9  --  14.3  --  13.4   MCV  --  85 86  --  85  --  85   PLT  --  285 301  --  376  --  327   NA  --  131*  --  139 137  --  140   POTASSIUM 3.5 3.6  --  4.3  4.1 3.3*  --  4.0   CHLORIDE  --  98  --  106 100  --  101   CO2  --  24  --  24 28  --  29   BUN  --  5.0*  --  11.0 9.8  --  9.2   CR  --  0.68  --  0.85 1.14*  --  0.93   ANIONGAP  --  9  --  9 9  --  10   PHILIP  --  8.7  --  8.3* 8.7  --  9.4   GLC  --  96  --  97 87  --  115*   ALBUMIN 3.6 3.9 3.6 3.8 4.2   < > 4.3   PROTTOTAL 6.5 6.8 6.3* 6.4 7.2   < > 7.4   BILITOTAL 0.9 1.2 1.2 1.2 2.4*   < > 1.4*   ALKPHOS 95 117* 124* 129* 128*   < > 94   * 184* 226* 245* 275*   < > 202*   AST 40* 53* 106* 132* 156*   < > 360*   LIPASE  --   --   --   --  >3,000*  --  33    < > = values in this interval not displayed.     No results found for this or any previous visit (from the past 24 hour(s)).

## 2022-11-05 NOTE — CARE PLAN
"PRIMARY DIAGNOSIS: \"GENERIC\" NURSING  OUTPATIENT/OBSERVATION GOALS TO BE MET BEFORE DISCHARGE:  1. ADLs back to baseline: Yes    2. Activity and level of assistance: Ambulating independently.    3. Pain status: Improved-controlled with oral pain medications.     4. Return to near baseline physical activity: Yes     Lap sites x5 c/d/i. Minimal abdominal discomfort. Blood pressures continue to be elevated but improved enough to not need PRN medication. C/o headache with the elevated blood pressures. PRN Tylenol given. Pt reports headache slightly better, just feeling warm. Denied dinner from this cafeteria but family bringing in food.     Discharge Planner Nurse   Safe discharge environment identified: Yes  Barriers to discharge: Yes - High blood pressure       Entered by: Carlene Barber RN 11/04/2022 7:06 PM     Please review provider order for any additional goals.   Nurse to notify provider when observation goals have been met and patient is ready for discharge.      "

## 2022-11-05 NOTE — PROGRESS NOTES
ASSESSMENT:  1. Choledocholithiasis        Lian Carney is a 27 year old female who is s/p laparoscopic cholecystectomy on 11/04/22. She is doing well this morning from a surgical perspective. [facility] managing hypertension issues.    PLAN:  Home from a surgical perspective once deemed appropriate by medicine  Surgery discharge recommendations/instructions placed    SUBJECTIVE:   She is doing well. Pain is not controlled and she is tolerating oral intake but has a limited appetite.       Patient Vitals for the past 24 hrs:   BP Temp Temp src Pulse Resp SpO2   11/05/22 1125 (!) 144/95 98.8  F (37.1  C) Oral 88 20 99 %   11/05/22 0812 (!) 161/92 98.8  F (37.1  C) Oral 88 20 97 %   11/05/22 0610 (!) 157/88 -- -- -- -- --   11/05/22 0409 (!) 180/106 98.4  F (36.9  C) Oral 98 20 96 %   11/05/22 0206 (!) 184/102 -- -- -- -- --   11/04/22 2321 (!) 180/104 97.4  F (36.3  C) Oral 84 20 95 %   11/04/22 2100 (!) 173/89 -- -- 91 20 96 %   11/04/22 1918 (!) 180/95 97.4  F (36.3  C) Oral 83 20 97 %   11/04/22 1657 (!) 164/91 -- -- 88 -- 96 %   11/04/22 1521 (!) 205/105 99  F (37.2  C) Oral 87 20 96 %   11/04/22 1516 (!) 188/102 -- -- 86 21 96 %   11/04/22 1347 (!) 168/96 -- -- 97 -- 95 %   11/04/22 1300 (!) 194/109 -- -- 93 20 99 %         PHYSICAL EXAM:  GEN: No acute distress, comfortable  LUNGS: CTA bilaterally  CV:RRR  ABD: soft, nontender, not distended; incisions CDI  EXT:No cyanosis, edema or obvious abnormalities    11/04 0700 - 11/05 0659  In: 1602.33 [P.O.:360; I.V.:1242.33]  Out: 1305 [Urine:1200]    Admission on 11/02/2022   Component Date Value     Sodium 11/02/2022 137      Potassium 11/02/2022 3.3 (L)      Chloride 11/02/2022 100      Carbon Dioxide (CO2) 11/02/2022 28      Anion Gap 11/02/2022 9      Urea Nitrogen 11/02/2022 9.8      Creatinine 11/02/2022 1.14 (H)      Calcium 11/02/2022 8.7      Glucose 11/02/2022 87      Alkaline Phosphatase 11/02/2022 128 (H)      AST 11/02/2022 156 (H)      ALT 11/02/2022 275 (H)       Protein Total 11/02/2022 7.2      Albumin 11/02/2022 4.2      Bilirubin Total 11/02/2022 2.4 (H)      GFR Estimate 11/02/2022 68      Lipase 11/02/2022 >3,000 (H)      Magnesium 11/02/2022 2.1      SARS CoV2 PCR 11/02/2022 Negative      WBC Count 11/02/2022 12.8 (H)      RBC Count 11/02/2022 5.21 (H)      Hemoglobin 11/02/2022 14.3      Hematocrit 11/02/2022 44.4      MCV 11/02/2022 85      MCH 11/02/2022 27.4      MCHC 11/02/2022 32.2      RDW 11/02/2022 13.4      Platelet Count 11/02/2022 376      % Neutrophils 11/02/2022 82      % Lymphocytes 11/02/2022 12      % Monocytes 11/02/2022 5      % Eosinophils 11/02/2022 1      % Basophils 11/02/2022 0      % Immature Granulocytes 11/02/2022 0      NRBCs per 100 WBC 11/02/2022 0      Absolute Neutrophils 11/02/2022 10.5 (H)      Absolute Lymphocytes 11/02/2022 1.5      Absolute Monocytes 11/02/2022 0.7      Absolute Eosinophils 11/02/2022 0.1      Absolute Basophils 11/02/2022 0.0      Absolute Immature Granul* 11/02/2022 0.1      Absolute NRBCs 11/02/2022 0.0      Potassium 11/03/2022 4.1      Protein Total 11/03/2022 6.3 (L)      Albumin 11/03/2022 3.6      Bilirubin Total 11/03/2022 1.2      Alkaline Phosphatase 11/03/2022 124 (H)      AST 11/03/2022 106 (H)      ALT 11/03/2022 226 (H)      Bilirubin Direct 11/03/2022 0.41 (H)      Protein Total 11/03/2022 6.4      Albumin 11/03/2022 3.8      Bilirubin Total 11/03/2022 1.2      Alkaline Phosphatase 11/03/2022 129 (H)      AST 11/03/2022 132 (H)      ALT 11/03/2022 245 (H)      Bilirubin Direct 11/03/2022 0.46 (H)      Sodium 11/03/2022 139      Potassium 11/03/2022 4.3      Chloride 11/03/2022 106      Carbon Dioxide (CO2) 11/03/2022 24      Anion Gap 11/03/2022 9      Urea Nitrogen 11/03/2022 11.0      Creatinine 11/03/2022 0.85      Calcium 11/03/2022 8.3 (L)      Glucose 11/03/2022 97      GFR Estimate 11/03/2022 >90      WBC Count 11/03/2022 7.9      RBC Count 11/03/2022 4.66      Hemoglobin 11/03/2022 12.9       Hematocrit 11/03/2022 40.0      MCV 11/03/2022 86      MCH 11/03/2022 27.7      MCHC 11/03/2022 32.3      RDW 11/03/2022 13.8      Platelet Count 11/03/2022 301      % Neutrophils 11/03/2022 73      % Lymphocytes 11/03/2022 20      % Monocytes 11/03/2022 6      % Eosinophils 11/03/2022 1      % Basophils 11/03/2022 0      % Immature Granulocytes 11/03/2022 0      NRBCs per 100 WBC 11/03/2022 0      Absolute Neutrophils 11/03/2022 5.8      Absolute Lymphocytes 11/03/2022 1.5      Absolute Monocytes 11/03/2022 0.5      Absolute Eosinophils 11/03/2022 0.0      Absolute Basophils 11/03/2022 0.0      Absolute Immature Granul* 11/03/2022 0.0      Absolute NRBCs 11/03/2022 0.0      Protein Total 11/04/2022 6.8      Albumin 11/04/2022 3.9      Bilirubin Total 11/04/2022 1.2      Alkaline Phosphatase 11/04/2022 117 (H)      AST 11/04/2022 53 (H)      ALT 11/04/2022 184 (H)      Bilirubin Direct 11/04/2022 0.31 (H)      Sodium 11/04/2022 131 (L)      Potassium 11/04/2022 3.6      Chloride 11/04/2022 98      Carbon Dioxide (CO2) 11/04/2022 24      Anion Gap 11/04/2022 9      Urea Nitrogen 11/04/2022 5.0 (L)      Creatinine 11/04/2022 0.68      Calcium 11/04/2022 8.7      Glucose 11/04/2022 96      GFR Estimate 11/04/2022 >90      WBC Count 11/04/2022 11.7 (H)      RBC Count 11/04/2022 4.80      Hemoglobin 11/04/2022 13.2      Hematocrit 11/04/2022 40.9      MCV 11/04/2022 85      MCH 11/04/2022 27.5      MCHC 11/04/2022 32.3      RDW 11/04/2022 13.7      Platelet Count 11/04/2022 285      % Neutrophils 11/04/2022 81      % Lymphocytes 11/04/2022 13      % Monocytes 11/04/2022 6      % Eosinophils 11/04/2022 0      % Basophils 11/04/2022 0      % Immature Granulocytes 11/04/2022 0      NRBCs per 100 WBC 11/04/2022 0      Absolute Neutrophils 11/04/2022 9.6 (H)      Absolute Lymphocytes 11/04/2022 1.5      Absolute Monocytes 11/04/2022 0.6      Absolute Eosinophils 11/04/2022 0.0      Absolute Basophils 11/04/2022 0.0       Absolute Immature Granul* 11/04/2022 0.1      Absolute NRBCs 11/04/2022 0.0      Potassium 11/05/2022 3.5      Protein Total 11/05/2022 6.5      Albumin 11/05/2022 3.6      Bilirubin Total 11/05/2022 0.9      Alkaline Phosphatase 11/05/2022 95      AST 11/05/2022 40 (H)      ALT 11/05/2022 117 (H)      Bilirubin Direct 11/05/2022 0.24      Sodium 11/05/2022 138           Ernestine Herman, APRN CNP

## 2022-11-06 ENCOUNTER — APPOINTMENT (OUTPATIENT)
Dept: CARDIOLOGY | Facility: HOSPITAL | Age: 27
End: 2022-11-06
Attending: INTERNAL MEDICINE

## 2022-11-06 VITALS
TEMPERATURE: 100.3 F | OXYGEN SATURATION: 99 % | SYSTOLIC BLOOD PRESSURE: 170 MMHG | DIASTOLIC BLOOD PRESSURE: 92 MMHG | HEIGHT: 63 IN | RESPIRATION RATE: 18 BRPM | WEIGHT: 260 LBS | HEART RATE: 110 BPM | BODY MASS INDEX: 46.07 KG/M2

## 2022-11-06 LAB
ALBUMIN SERPL BCG-MCNC: 3.4 G/DL (ref 3.5–5.2)
ALP SERPL-CCNC: 90 U/L (ref 35–104)
ALT SERPL W P-5'-P-CCNC: 95 U/L (ref 10–35)
ANION GAP SERPL CALCULATED.3IONS-SCNC: 9 MMOL/L (ref 7–15)
AST SERPL W P-5'-P-CCNC: 33 U/L (ref 10–35)
BILIRUB SERPL-MCNC: 0.8 MG/DL
BUN SERPL-MCNC: 13.8 MG/DL (ref 6–20)
CALCIUM SERPL-MCNC: 8.4 MG/DL (ref 8.6–10)
CHLORIDE SERPL-SCNC: 103 MMOL/L (ref 98–107)
CREAT SERPL-MCNC: 0.77 MG/DL (ref 0.51–0.95)
DEPRECATED HCO3 PLAS-SCNC: 23 MMOL/L (ref 22–29)
ERYTHROCYTE [DISTWIDTH] IN BLOOD BY AUTOMATED COUNT: 14.6 % (ref 10–15)
GFR SERPL CREATININE-BSD FRML MDRD: >90 ML/MIN/1.73M2
GLUCOSE SERPL-MCNC: 84 MG/DL (ref 70–99)
HCT VFR BLD AUTO: 33.1 % (ref 35–47)
HGB BLD-MCNC: 10.6 G/DL (ref 11.7–15.7)
HOLD SPECIMEN: NORMAL
MCH RBC QN AUTO: 27.9 PG (ref 26.5–33)
MCHC RBC AUTO-ENTMCNC: 32 G/DL (ref 31.5–36.5)
MCV RBC AUTO: 87 FL (ref 78–100)
PLATELET # BLD AUTO: 255 10E3/UL (ref 150–450)
POTASSIUM SERPL-SCNC: 3.5 MMOL/L (ref 3.4–5.3)
PROT SERPL-MCNC: 6.3 G/DL (ref 6.4–8.3)
RBC # BLD AUTO: 3.8 10E6/UL (ref 3.8–5.2)
SODIUM SERPL-SCNC: 135 MMOL/L (ref 136–145)
TSH SERPL DL<=0.005 MIU/L-ACNC: 4.02 UIU/ML (ref 0.3–4.2)
WBC # BLD AUTO: 10.7 10E3/UL (ref 4–11)

## 2022-11-06 PROCEDURE — 84443 ASSAY THYROID STIM HORMONE: CPT | Performed by: INTERNAL MEDICINE

## 2022-11-06 PROCEDURE — 82088 ASSAY OF ALDOSTERONE: CPT | Performed by: INTERNAL MEDICINE

## 2022-11-06 PROCEDURE — 96376 TX/PRO/DX INJ SAME DRUG ADON: CPT

## 2022-11-06 PROCEDURE — G0378 HOSPITAL OBSERVATION PER HR: HCPCS

## 2022-11-06 PROCEDURE — 82533 TOTAL CORTISOL: CPT | Performed by: INTERNAL MEDICINE

## 2022-11-06 PROCEDURE — 250N000011 HC RX IP 250 OP 636: Performed by: HOSPITALIST

## 2022-11-06 PROCEDURE — 84244 ASSAY OF RENIN: CPT | Performed by: INTERNAL MEDICINE

## 2022-11-06 PROCEDURE — 80053 COMPREHEN METABOLIC PANEL: CPT | Performed by: STUDENT IN AN ORGANIZED HEALTH CARE EDUCATION/TRAINING PROGRAM

## 2022-11-06 PROCEDURE — 85027 COMPLETE CBC AUTOMATED: CPT | Performed by: STUDENT IN AN ORGANIZED HEALTH CARE EDUCATION/TRAINING PROGRAM

## 2022-11-06 PROCEDURE — 250N000013 HC RX MED GY IP 250 OP 250 PS 637: Performed by: INTERNAL MEDICINE

## 2022-11-06 PROCEDURE — 36415 COLL VENOUS BLD VENIPUNCTURE: CPT | Performed by: INTERNAL MEDICINE

## 2022-11-06 PROCEDURE — 99225 PR SUBSEQUENT OBSERVATION CARE,LEVEL II: CPT | Performed by: INTERNAL MEDICINE

## 2022-11-06 PROCEDURE — 99024 POSTOP FOLLOW-UP VISIT: CPT | Performed by: NURSE PRACTITIONER

## 2022-11-06 PROCEDURE — 255N000002 HC RX 255 OP 636: Performed by: INTERNAL MEDICINE

## 2022-11-06 PROCEDURE — 250N000011 HC RX IP 250 OP 636: Performed by: NURSE PRACTITIONER

## 2022-11-06 PROCEDURE — 93306 TTE W/DOPPLER COMPLETE: CPT | Mod: 26 | Performed by: INTERNAL MEDICINE

## 2022-11-06 PROCEDURE — 82040 ASSAY OF SERUM ALBUMIN: CPT | Performed by: STUDENT IN AN ORGANIZED HEALTH CARE EDUCATION/TRAINING PROGRAM

## 2022-11-06 PROCEDURE — 36415 COLL VENOUS BLD VENIPUNCTURE: CPT | Performed by: STUDENT IN AN ORGANIZED HEALTH CARE EDUCATION/TRAINING PROGRAM

## 2022-11-06 RX ORDER — PANTOPRAZOLE SODIUM 40 MG/1
40 TABLET, DELAYED RELEASE ORAL
Status: DISCONTINUED | OUTPATIENT
Start: 2022-11-06 | End: 2022-11-06 | Stop reason: HOSPADM

## 2022-11-06 RX ORDER — ACETAMINOPHEN 325 MG/1
650 TABLET ORAL EVERY 6 HOURS PRN
COMMUNITY
Start: 2022-11-06

## 2022-11-06 RX ORDER — AMLODIPINE BESYLATE 10 MG/1
10 TABLET ORAL DAILY
Qty: 30 TABLET | Refills: 0 | Status: SHIPPED | OUTPATIENT
Start: 2022-11-07

## 2022-11-06 RX ORDER — AMLODIPINE BESYLATE 5 MG/1
5 TABLET ORAL ONCE
Status: COMPLETED | OUTPATIENT
Start: 2022-11-06 | End: 2022-11-06

## 2022-11-06 RX ORDER — AMLODIPINE BESYLATE 5 MG/1
10 TABLET ORAL DAILY
Status: DISCONTINUED | OUTPATIENT
Start: 2022-11-07 | End: 2022-11-06 | Stop reason: HOSPADM

## 2022-11-06 RX ORDER — LOSARTAN POTASSIUM 25 MG/1
25 TABLET ORAL DAILY
Status: DISCONTINUED | OUTPATIENT
Start: 2022-11-06 | End: 2022-11-06 | Stop reason: HOSPADM

## 2022-11-06 RX ORDER — LOSARTAN POTASSIUM 25 MG/1
25 TABLET ORAL DAILY
Qty: 30 TABLET | Refills: 0 | Status: SHIPPED | OUTPATIENT
Start: 2022-11-07

## 2022-11-06 RX ORDER — AMLODIPINE BESYLATE 5 MG/1
5 TABLET ORAL DAILY
Status: DISCONTINUED | OUTPATIENT
Start: 2022-11-06 | End: 2022-11-06

## 2022-11-06 RX ORDER — OXYCODONE HYDROCHLORIDE 5 MG/1
5 TABLET ORAL EVERY 6 HOURS PRN
Qty: 12 TABLET | Refills: 0 | Status: SHIPPED | OUTPATIENT
Start: 2022-11-06

## 2022-11-06 RX ADMIN — LOSARTAN POTASSIUM 25 MG: 25 TABLET, FILM COATED ORAL at 14:11

## 2022-11-06 RX ADMIN — PERFLUTREN 3 ML: 6.52 INJECTION, SUSPENSION INTRAVENOUS at 11:00

## 2022-11-06 RX ADMIN — HYDRALAZINE HYDROCHLORIDE 10 MG: 20 INJECTION, SOLUTION INTRAMUSCULAR; INTRAVENOUS at 14:07

## 2022-11-06 RX ADMIN — AMLODIPINE BESYLATE 5 MG: 5 TABLET ORAL at 10:26

## 2022-11-06 RX ADMIN — KETOROLAC TROMETHAMINE 15 MG: 15 INJECTION, SOLUTION INTRAMUSCULAR; INTRAVENOUS at 01:11

## 2022-11-06 RX ADMIN — PANTOPRAZOLE SODIUM 40 MG: 40 TABLET, DELAYED RELEASE ORAL at 10:29

## 2022-11-06 RX ADMIN — AMLODIPINE BESYLATE 5 MG: 5 TABLET ORAL at 12:07

## 2022-11-06 ASSESSMENT — ACTIVITIES OF DAILY LIVING (ADL)
ADLS_ACUITY_SCORE: 31

## 2022-11-06 NOTE — SIGNIFICANT EVENT
Significant Event Note    Time of event: 4:00 PM November 6, 2022    Description of event:  Notified by nursing staff the patient wants to leave AMA and does not want wait for further work-up regarding her hypertension.    Plan:  Explained the risk of leaving AMA to the patient which is including but not limited to worsening medical condition, acute coronary syndrome, stroke and even death.  Patient confirms she understands all risks and still awaiting to go AMA.  She asked for prescription for blood pressure medications till she follows up with her provider.    Discussed with: patient's family/emergency contact and bedside nurse    David Hernández MD

## 2022-11-06 NOTE — PLAN OF CARE
"PRIMARY DIAGNOSIS: \"GENERIC\" NURSING  OUTPATIENT/OBSERVATION GOALS TO BE MET BEFORE DISCHARGE:  ADLs back to baseline: Yes    Activity and level of assistance: Ambulating independently.    Pain status: Improved but still requiring IV narcotics.    Return to near baseline physical activity: Yes     Discharge Planner Nurse   Safe discharge environment identified: Yes  Barriers to discharge: No       Entered by: Ping Carney RN 11/06/2022 4:10 AM     Please review provider order for any additional goals.   Nurse to notify provider when observation goals have been met and patient is ready for discharge.    Goal Outcome Evaluation: Pt c/o some stomach and neck pain. Pain was manage with schedule Toradol. /70. Pt is A&O x4. Independent in room.                         "

## 2022-11-06 NOTE — PLAN OF CARE
"PRIMARY DIAGNOSIS: \"GENERIC\" NURSING  OUTPATIENT/OBSERVATION GOALS TO BE MET BEFORE DISCHARGE:  1. ADLs back to baseline: Yes    2. Activity and level of assistance: Ambulating independently.    3. Pain status: Pain free. Declined the scheduled dose of ketorolac at 20:00.     4. Return to near baseline physical activity: Yes     Discharge Planner Nurse   Safe discharge environment identified: Yes  Barriers to discharge: Yes - Pending blood pressure in the morning along with sodium level. Likely early discharge        Entered by: Carlene Barber RN 11/05/2022 10:46 PM     Please review provider order for any additional goals.   Nurse to notify provider when observation goals have been met and patient is ready for discharge.    Goal Outcome Evaluation:       Pt continues to endorse feeling much better then yesterday. Describes neck discomfort but states \"I miss my bed and pillow.\" Direction from Dr. Rogers was for patient to follow up in clinic regarding blood pressures and new lisinopril. Pt states she didn't need any help with getting a provider. She would look into the clinic where her parents go.                  "

## 2022-11-06 NOTE — PLAN OF CARE
Problem: Pain Acute  Goal: Optimal Pain Control and Function  Outcome: Progressing   Denies any pain.       Problem: Hypertension Acute  Goal: Blood Pressure Within Desired Range  Outcome: Not Progressing   BP continues to be high even after two oral BP medications and IV hydralazine. Checked MD Hernández wants to consult nephrology and does not want patient to discharge but patient wants to leave AMA-updated MD Hernández and he is coming to see patient. Pt very eager to leave all day.     Lillie Burleson RN 11/6/2022 3:53 PM

## 2022-11-06 NOTE — PLAN OF CARE
"PRIMARY DIAGNOSIS: \"GENERIC\" NURSING  OUTPATIENT/OBSERVATION GOALS TO BE MET BEFORE DISCHARGE:  ADLs back to baseline: Yes    Activity and level of assistance: Ambulating independently.    Pain status: Improved but still requiring IV narcotics.    Return to near baseline physical activity: Yes     Discharge Planner Nurse   Safe discharge environment identified: Yes  Barriers to discharge: Yes       Entered by: Ping Carney RN 11/06/2022 1:15 AM     Please review provider order for any additional goals.   Nurse to notify provider when observation goals have been met and patient is ready for discharge.    Goal Outcome Evaluation: Pt c/o some stomach and neck pain. Pt refused medication and stated she would wait for schedule Toradol. BP was 173/106. I went to get PRN hydralazine. Took BP again and it was 144/80. PRN hydralazine not given. Pt is A&O x4. Independent in room.                         "

## 2022-11-06 NOTE — PLAN OF CARE
Goal Outcome Evaluation:        1625... Pt left Against Medical Advice. Disharged med(Oxycodone) was given to her.

## 2022-11-06 NOTE — PLAN OF CARE
"PRIMARY DIAGNOSIS: \"GENERIC\" NURSING  OUTPATIENT/OBSERVATION GOALS TO BE MET BEFORE DISCHARGE:  1. ADLs back to baseline: Yes    2. Activity and level of assistance: Ambulating independently.    3. Pain status: Pain free. States no pain. \"Feel so much better today.\" Continues with IV ketorolac scheduled every 6 hours per surgery.    4. Return to near baseline physical activity: Yes     Discharge Planner Nurse   Safe discharge environment identified: Yes  Barriers to discharge: Yes - Improving blood pressure. New lisinopril given today - pending pressures in the morning 11/6. Sodium level check in the morning.       Entered by: Carlene Barber RN 11/05/2022 7:24 PM     Please review provider order for any additional goals.   Nurse to notify provider when observation goals have been met and patient is ready for discharge.    Goal Outcome Evaluation:       Ambulating independently in room and post. Blood pressure 135/73. Denies headache. Describes soreness to abdomen s/p lap angy on 11/4. Lap sites x5 c/d/i. Denies nausea. Tolerating regular diet.                  "

## 2022-11-06 NOTE — DISCHARGE SUMMARY
Discharge AGAINST MEDICAL ADVICE    Patient  wants to leave AMA and does not want wait for further work-up regarding her hypertension.  Please refer to significant events for details  Patient was instructed to come back to ER if worsening condition  Prescription was sent for losartan and Norvasc and patient will follow up with her PCP.      David Hernández MD  [facility]  [phone]

## 2022-11-07 LAB
CORTIS SERPL-MCNC: 16.3 UG/DL
PATH REPORT.COMMENTS IMP SPEC: NORMAL
PATH REPORT.COMMENTS IMP SPEC: NORMAL
PATH REPORT.FINAL DX SPEC: NORMAL
PATH REPORT.GROSS SPEC: NORMAL
PATH REPORT.MICROSCOPIC SPEC OTHER STN: NORMAL
PATH REPORT.RELEVANT HX SPEC: NORMAL
PHOTO IMAGE: NORMAL

## 2022-11-08 LAB — ALDOST SERPL-MCNC: <3 NG/DL (ref 0–31)

## 2022-11-10 LAB
ALDOST/RENIN PLAS-RTO: NORMAL {RATIO}
RENIN PLAS-CCNC: 3.1 NG/ML/HR

## 2023-01-12 LAB
ATRIAL RATE - MUSE: 70 BPM
ATRIAL RATE - MUSE: 74 BPM
DIASTOLIC BLOOD PRESSURE - MUSE: NORMAL MMHG
DIASTOLIC BLOOD PRESSURE - MUSE: NORMAL MMHG
INTERPRETATION ECG - MUSE: NORMAL
INTERPRETATION ECG - MUSE: NORMAL
P AXIS - MUSE: 49 DEGREES
P AXIS - MUSE: 50 DEGREES
PR INTERVAL - MUSE: 170 MS
PR INTERVAL - MUSE: 172 MS
QRS DURATION - MUSE: 86 MS
QRS DURATION - MUSE: 88 MS
QT - MUSE: 402 MS
QT - MUSE: 420 MS
QTC - MUSE: 434 MS
QTC - MUSE: 466 MS
R AXIS - MUSE: 21 DEGREES
R AXIS - MUSE: 24 DEGREES
SYSTOLIC BLOOD PRESSURE - MUSE: NORMAL MMHG
SYSTOLIC BLOOD PRESSURE - MUSE: NORMAL MMHG
T AXIS - MUSE: 44 DEGREES
T AXIS - MUSE: 57 DEGREES
VENTRICULAR RATE- MUSE: 70 BPM
VENTRICULAR RATE- MUSE: 74 BPM

## (undated) DEVICE — ADH SKIN CLOSURE PREMIERPRO EXOFIN 1.0ML 3470

## (undated) DEVICE — Device

## (undated) DEVICE — CATH IV ANGIO INTRO 14GA X 1 3/4" 381467

## (undated) DEVICE — PLATE GROUNDING ADULT W/CORD 9165L

## (undated) DEVICE — ENDO SHEARS RENEW LAP ENDOCUT SCISSOR TIP 16.5MM 3142

## (undated) DEVICE — NDL INSUFFLATION 13GA 120MM C2201

## (undated) DEVICE — ENDO TROCAR FIRST ENTRY KII FIOS Z-THRD 05X100MM CTF03

## (undated) DEVICE — CLIP APPLIER ENDO ROTATING 10MM MED/LG ER320

## (undated) DEVICE — SUCTION MANIFOLD NEPTUNE 2 SYS 1 PORT 702-025-000

## (undated) DEVICE — GLOVE BIOGEL PI INDICATOR 8.0 LF 41680

## (undated) DEVICE — SU VICRYL+ 0 27 UR6 VLT VCP603H

## (undated) DEVICE — TUBING SMOKE EVAC PNEUMOCLEAR HIGH FLOW 0620050250

## (undated) DEVICE — ENDO TROCAR FIRST ENTRY KII FIOS Z-THRD 11X100MM CTF33

## (undated) DEVICE — GLOVE BIOGEL PI ULTRATOUCH G SZ 7.5 42175

## (undated) DEVICE — SU MONOCRYL+ 4-0 18IN PS2 UND MCP496G

## (undated) DEVICE — SYR 30ML LL W/O NDL 302832

## (undated) DEVICE — TUBING LAP SUCT/IRRIG STRYKER 250070500

## (undated) DEVICE — CONTAINER URINE SPEC 4OZ STRL 1053

## (undated) DEVICE — CATH CHOLANGIOGRAM 4.5FR TAUT METAL TIP 20018-M55

## (undated) DEVICE — TUBING SUCTION MEDI-VAC 1/4"X20' N620A - HE

## (undated) DEVICE — TUBING IV EXTENSION SET ANESTHESIA 34" MLL 2C6227

## (undated) DEVICE — STOPCOCK 3 WAY 500 PSI M3SNC

## (undated) DEVICE — DECANTER VIAL 2006S

## (undated) DEVICE — ENDO TROCAR SLEEVE KII Z-THREADED 05X100MM CTS02

## (undated) DEVICE — SOL WATER IRRIG 1000ML BOTTLE 2F7114

## (undated) DEVICE — CUSTOM PACK LAP CHOLE SBA5BLCHEA

## (undated) DEVICE — DRAPE C-ARM 60X42" 1013

## (undated) DEVICE — PREP CHLORAPREP 26ML TINTED HI-LITE ORANGE 930815

## (undated) DEVICE — SOL NACL 0.9% INJ 1000ML BAG 2B1324X

## (undated) RX ORDER — BUPIVACAINE HYDROCHLORIDE AND EPINEPHRINE 2.5; 5 MG/ML; UG/ML
INJECTION, SOLUTION INFILTRATION; PERINEURAL
Status: DISPENSED
Start: 2022-11-04

## (undated) RX ORDER — FENTANYL CITRATE 50 UG/ML
INJECTION, SOLUTION INTRAMUSCULAR; INTRAVENOUS
Status: DISPENSED
Start: 2022-11-04

## (undated) RX ORDER — FENTANYL CITRATE-0.9 % NACL/PF 10 MCG/ML
PLASTIC BAG, INJECTION (ML) INTRAVENOUS
Status: DISPENSED
Start: 2022-11-04

## (undated) RX ORDER — PROPOFOL 10 MG/ML
INJECTION, EMULSION INTRAVENOUS
Status: DISPENSED
Start: 2022-11-04